# Patient Record
Sex: FEMALE | NOT HISPANIC OR LATINO | Employment: FULL TIME | URBAN - METROPOLITAN AREA
[De-identification: names, ages, dates, MRNs, and addresses within clinical notes are randomized per-mention and may not be internally consistent; named-entity substitution may affect disease eponyms.]

---

## 2017-09-15 ENCOUNTER — OFFICE VISIT - HEALTHEAST (OUTPATIENT)
Dept: FAMILY MEDICINE | Facility: CLINIC | Age: 57
End: 2017-09-15

## 2017-09-15 DIAGNOSIS — J32.9 SINUSITIS: ICD-10-CM

## 2019-05-31 ENCOUNTER — OFFICE VISIT - HEALTHEAST (OUTPATIENT)
Dept: FAMILY MEDICINE | Facility: CLINIC | Age: 59
End: 2019-05-31

## 2019-05-31 DIAGNOSIS — Z00.01 ENCOUNTER FOR ROUTINE ADULT HEALTH EXAMINATION WITH ABNORMAL FINDINGS: ICD-10-CM

## 2019-05-31 DIAGNOSIS — Z13.1 ENCOUNTER FOR SCREENING FOR DIABETES MELLITUS: ICD-10-CM

## 2019-05-31 DIAGNOSIS — Z13.220 ENCOUNTER FOR SCREENING FOR LIPOID DISORDERS: ICD-10-CM

## 2019-05-31 DIAGNOSIS — Z12.31 VISIT FOR SCREENING MAMMOGRAM: ICD-10-CM

## 2019-05-31 DIAGNOSIS — M25.552 HIP PAIN, LEFT: ICD-10-CM

## 2019-05-31 DIAGNOSIS — Z12.11 SCREEN FOR COLON CANCER: ICD-10-CM

## 2019-05-31 LAB
CHOLEST SERPL-MCNC: 203 MG/DL
ERYTHROCYTE [DISTWIDTH] IN BLOOD BY AUTOMATED COUNT: 9.9 % (ref 11–14.5)
FASTING STATUS PATIENT QL REPORTED: YES
FASTING STATUS PATIENT QL REPORTED: YES
GLUCOSE BLD-MCNC: 93 MG/DL (ref 70–99)
HCT VFR BLD AUTO: 42 % (ref 35–47)
HDLC SERPL-MCNC: 50 MG/DL
HGB BLD-MCNC: 14.4 G/DL (ref 12–16)
LDLC SERPL CALC-MCNC: 143 MG/DL
MCH RBC QN AUTO: 32.8 PG (ref 27–34)
MCHC RBC AUTO-ENTMCNC: 34.3 G/DL (ref 32–36)
MCV RBC AUTO: 96 FL (ref 80–100)
PLATELET # BLD AUTO: 200 THOU/UL (ref 140–440)
PMV BLD AUTO: 9.2 FL (ref 7–10)
RBC # BLD AUTO: 4.39 MILL/UL (ref 3.8–5.4)
TRIGL SERPL-MCNC: 52 MG/DL
WBC: 4.3 THOU/UL (ref 4–11)

## 2019-05-31 ASSESSMENT — MIFFLIN-ST. JEOR: SCORE: 1641.81

## 2019-06-14 ENCOUNTER — OFFICE VISIT - HEALTHEAST (OUTPATIENT)
Dept: PHYSICAL THERAPY | Facility: REHABILITATION | Age: 59
End: 2019-06-14

## 2019-06-14 DIAGNOSIS — M25.552 CHRONIC LEFT HIP PAIN: ICD-10-CM

## 2019-06-14 DIAGNOSIS — G89.29 CHRONIC LEFT HIP PAIN: ICD-10-CM

## 2019-06-14 DIAGNOSIS — M62.81 GENERALIZED MUSCLE WEAKNESS: ICD-10-CM

## 2019-06-21 ENCOUNTER — OFFICE VISIT - HEALTHEAST (OUTPATIENT)
Dept: PHYSICAL THERAPY | Facility: REHABILITATION | Age: 59
End: 2019-06-21

## 2019-06-21 DIAGNOSIS — M25.552 CHRONIC LEFT HIP PAIN: ICD-10-CM

## 2019-06-21 DIAGNOSIS — M62.81 GENERALIZED MUSCLE WEAKNESS: ICD-10-CM

## 2019-06-21 DIAGNOSIS — G89.29 CHRONIC LEFT HIP PAIN: ICD-10-CM

## 2019-08-02 ENCOUNTER — RECORDS - HEALTHEAST (OUTPATIENT)
Dept: RADIOLOGY | Facility: CLINIC | Age: 59
End: 2019-08-02

## 2019-08-02 ENCOUNTER — HOSPITAL ENCOUNTER (OUTPATIENT)
Dept: MAMMOGRAPHY | Facility: CLINIC | Age: 59
Discharge: HOME | End: 2019-08-02
Attending: FAMILY MEDICINE

## 2019-08-02 ENCOUNTER — RECORDS - HEALTHEAST (OUTPATIENT)
Dept: ADMINISTRATIVE | Facility: OTHER | Age: 59
End: 2019-08-02

## 2019-08-02 DIAGNOSIS — Z12.31 VISIT FOR SCREENING MAMMOGRAM: ICD-10-CM

## 2019-11-08 ENCOUNTER — RECORDS - HEALTHEAST (OUTPATIENT)
Dept: ADMINISTRATIVE | Facility: OTHER | Age: 59
End: 2019-11-08

## 2019-12-20 ENCOUNTER — OFFICE VISIT - HEALTHEAST (OUTPATIENT)
Dept: FAMILY MEDICINE | Facility: CLINIC | Age: 59
End: 2019-12-20

## 2019-12-20 DIAGNOSIS — I10 ESSENTIAL HYPERTENSION: ICD-10-CM

## 2019-12-20 DIAGNOSIS — R04.0 EPISTAXIS: ICD-10-CM

## 2019-12-20 LAB
ANION GAP SERPL CALCULATED.3IONS-SCNC: 6 MMOL/L (ref 5–18)
ATRIAL RATE - MUSE: 70 BPM
CHLORIDE BLD-SCNC: 105 MMOL/L (ref 98–107)
CO2 SERPL-SCNC: 29 MMOL/L (ref 22–31)
CREAT SERPL-MCNC: 0.73 MG/DL (ref 0.6–1.1)
DIASTOLIC BLOOD PRESSURE - MUSE: NORMAL
GFR SERPL CREATININE-BSD FRML MDRD: >60 ML/MIN/1.73M2
INTERPRETATION ECG - MUSE: NORMAL
P AXIS - MUSE: 30 DEGREES
POTASSIUM BLD-SCNC: 4.5 MMOL/L (ref 3.5–5)
PR INTERVAL - MUSE: 152 MS
QRS DURATION - MUSE: 72 MS
QT - MUSE: 388 MS
QTC - MUSE: 419 MS
R AXIS - MUSE: 18 DEGREES
SODIUM SERPL-SCNC: 140 MMOL/L (ref 136–145)
SYSTOLIC BLOOD PRESSURE - MUSE: NORMAL
T AXIS - MUSE: 11 DEGREES
VENTRICULAR RATE- MUSE: 70 BPM

## 2019-12-20 ASSESSMENT — MIFFLIN-ST. JEOR: SCORE: 1690.35

## 2020-01-14 ENCOUNTER — COMMUNICATION - HEALTHEAST (OUTPATIENT)
Dept: FAMILY MEDICINE | Facility: CLINIC | Age: 60
End: 2020-01-14

## 2020-01-14 DIAGNOSIS — I10 ESSENTIAL HYPERTENSION: ICD-10-CM

## 2020-01-31 ENCOUNTER — COMMUNICATION - HEALTHEAST (OUTPATIENT)
Dept: SCHEDULING | Facility: CLINIC | Age: 60
End: 2020-01-31

## 2020-01-31 DIAGNOSIS — I10 BENIGN ESSENTIAL HYPERTENSION: ICD-10-CM

## 2020-02-14 ENCOUNTER — OFFICE VISIT - HEALTHEAST (OUTPATIENT)
Dept: FAMILY MEDICINE | Facility: CLINIC | Age: 60
End: 2020-02-14

## 2020-02-14 DIAGNOSIS — T46.4X5A ADVERSE EFFECT OF ANGIOTENSIN-CONVERTING ENZYME INHIBITOR, INITIAL ENCOUNTER: ICD-10-CM

## 2020-02-14 DIAGNOSIS — I10 ESSENTIAL HYPERTENSION: ICD-10-CM

## 2020-02-14 DIAGNOSIS — R04.0 EPISTAXIS: ICD-10-CM

## 2020-02-14 LAB
ANION GAP SERPL CALCULATED.3IONS-SCNC: 9 MMOL/L (ref 5–18)
CHLORIDE BLD-SCNC: 104 MMOL/L (ref 98–107)
CO2 SERPL-SCNC: 26 MMOL/L (ref 22–31)
CREAT SERPL-MCNC: 0.78 MG/DL (ref 0.6–1.1)
GFR SERPL CREATININE-BSD FRML MDRD: >60 ML/MIN/1.73M2
POTASSIUM BLD-SCNC: 4.4 MMOL/L (ref 3.5–5)
SODIUM SERPL-SCNC: 139 MMOL/L (ref 136–145)

## 2020-02-14 ASSESSMENT — MIFFLIN-ST. JEOR: SCORE: 1748.86

## 2020-03-12 ENCOUNTER — AMBULATORY - HEALTHEAST (OUTPATIENT)
Dept: NURSING | Facility: CLINIC | Age: 60
End: 2020-03-12

## 2020-03-12 ENCOUNTER — COMMUNICATION - HEALTHEAST (OUTPATIENT)
Dept: FAMILY MEDICINE | Facility: CLINIC | Age: 60
End: 2020-03-12

## 2020-04-06 ENCOUNTER — COMMUNICATION - HEALTHEAST (OUTPATIENT)
Dept: SCHEDULING | Facility: CLINIC | Age: 60
End: 2020-04-06

## 2020-04-06 DIAGNOSIS — I10 ESSENTIAL HYPERTENSION: ICD-10-CM

## 2020-10-13 ENCOUNTER — AMBULATORY - HEALTHEAST (OUTPATIENT)
Dept: LAB | Facility: CLINIC | Age: 60
End: 2020-10-13

## 2020-10-13 DIAGNOSIS — I10 ESSENTIAL HYPERTENSION: ICD-10-CM

## 2020-10-13 LAB
ANION GAP SERPL CALCULATED.3IONS-SCNC: 11 MMOL/L (ref 5–18)
CHLORIDE BLD-SCNC: 104 MMOL/L (ref 98–107)
CO2 SERPL-SCNC: 26 MMOL/L (ref 22–31)
CREAT SERPL-MCNC: 0.76 MG/DL (ref 0.6–1.1)
GFR SERPL CREATININE-BSD FRML MDRD: >60 ML/MIN/1.73M2
POTASSIUM BLD-SCNC: 4.2 MMOL/L (ref 3.5–5)
SODIUM SERPL-SCNC: 141 MMOL/L (ref 136–145)

## 2020-11-16 ENCOUNTER — COMMUNICATION - HEALTHEAST (OUTPATIENT)
Dept: SCHEDULING | Facility: CLINIC | Age: 60
End: 2020-11-16

## 2020-11-16 DIAGNOSIS — I10 ESSENTIAL HYPERTENSION: ICD-10-CM

## 2020-11-17 ENCOUNTER — COMMUNICATION - HEALTHEAST (OUTPATIENT)
Dept: FAMILY MEDICINE | Facility: CLINIC | Age: 60
End: 2020-11-17

## 2020-11-17 DIAGNOSIS — I10 ESSENTIAL HYPERTENSION: ICD-10-CM

## 2020-12-01 ENCOUNTER — OFFICE VISIT - HEALTHEAST (OUTPATIENT)
Dept: FAMILY MEDICINE | Facility: CLINIC | Age: 60
End: 2020-12-01

## 2020-12-01 DIAGNOSIS — I10 ESSENTIAL HYPERTENSION: ICD-10-CM

## 2020-12-01 RX ORDER — LOSARTAN POTASSIUM 100 MG/1
100 TABLET ORAL DAILY
Qty: 90 TABLET | Refills: 3 | Status: SHIPPED | OUTPATIENT
Start: 2020-12-01 | End: 2021-12-20

## 2020-12-01 ASSESSMENT — MIFFLIN-ST. JEOR: SCORE: 1768.82

## 2020-12-31 ENCOUNTER — COMMUNICATION - HEALTHEAST (OUTPATIENT)
Dept: FAMILY MEDICINE | Facility: CLINIC | Age: 60
End: 2020-12-31

## 2020-12-31 DIAGNOSIS — I10 ESSENTIAL HYPERTENSION: ICD-10-CM

## 2021-01-04 RX ORDER — HYDROCHLOROTHIAZIDE 25 MG/1
25 TABLET ORAL DAILY
Qty: 90 TABLET | Refills: 2 | Status: SHIPPED | OUTPATIENT
Start: 2021-01-04 | End: 2021-09-22

## 2021-01-05 ENCOUNTER — AMBULATORY - HEALTHEAST (OUTPATIENT)
Dept: NURSING | Facility: CLINIC | Age: 61
End: 2021-01-05

## 2021-01-05 DIAGNOSIS — I10 BENIGN ESSENTIAL HYPERTENSION: ICD-10-CM

## 2021-03-24 ENCOUNTER — AMBULATORY - HEALTHEAST (OUTPATIENT)
Dept: NURSING | Facility: CLINIC | Age: 61
End: 2021-03-24

## 2021-04-14 ENCOUNTER — AMBULATORY - HEALTHEAST (OUTPATIENT)
Dept: NURSING | Facility: CLINIC | Age: 61
End: 2021-04-14

## 2021-05-25 ENCOUNTER — RECORDS - HEALTHEAST (OUTPATIENT)
Dept: ADMINISTRATIVE | Facility: CLINIC | Age: 61
End: 2021-05-25

## 2021-05-26 ENCOUNTER — RECORDS - HEALTHEAST (OUTPATIENT)
Dept: ADMINISTRATIVE | Facility: CLINIC | Age: 61
End: 2021-05-26

## 2021-05-26 VITALS — SYSTOLIC BLOOD PRESSURE: 144 MMHG | HEART RATE: 72 BPM | DIASTOLIC BLOOD PRESSURE: 96 MMHG

## 2021-05-27 VITALS — HEART RATE: 77 BPM | DIASTOLIC BLOOD PRESSURE: 85 MMHG | SYSTOLIC BLOOD PRESSURE: 130 MMHG

## 2021-05-29 NOTE — PROGRESS NOTES
[facility]   Hip Initial Evaluation    Patient Name: Zaida Stewart  Date of evaluation: 6/14/2019  Referral Diagnosis:   Referring provider: Juanita Mendoza MD  Visit Diagnosis:     ICD-10-CM    1. Chronic left hip pain M25.552     G89.29    2. Generalized muscle weakness M62.81        Assessment:   Zaida Stewart is a 58 y.o. female who presents to therapy today with chief complaints of left sided hip pain for 5 years without injury. Pt has Hx of hip pain treated with chiropractic care which has helped, but continues to experience L groin pain with standing/walking/ADL's such as dressing. Evaluation today reveals: restrictions in L hip PROM, + hip provocation testing, weakness. S/s appear consistent with hip OA and/or cartilage degeneration. Patient will benefit from 1:1 skilled physical therapy services to address the above limitations.     Goals:  Pt. will demonstrate/verbalize independence in self-management of condition in : 2 weeks  Pt. will be independent with home exercise program in : 2 weeks    Pt will: bend forward to tie shoes with pain 2/10 or less to improve ADL's in 8 weeks   Pt will: walk >15' without increased hip pain during or after for exercise in 8 weeks  Pt will: ascend/descend stairs with pain 2/10 or less to improve stair climbing in 8 weeks      Patient's expectations/goals are realistic.    Barriers to Learning or Achieving Goals:  No Barriers.       Plan / Patient Instructions:        Plan of Care:   Authorization / Certification Start Date: 06/14/19  Authorization / Certification End Date: 09/12/19  Authorization / Certification Number of Visits: up to 8 visits   Communication with: Referral Source;Patient Caregiver  Patient Related Instruction: Nature of Condition;Treatment plan and rationale;Basis of treatment;Body mechanics;Posture;Precautions;Next steps;Expected outcome  Times per Week: 1-2x/week  Number of Weeks: up to 12 weeks  Number of Visits: up to 8 visits    Discharge Planning: to I HEP  Therapeutic Exercise: ROM;Stretching;Strengthening  Neuromuscular Reeducation: kinesio tape;posture;balance/proprioception;TNE;postural restoration  Manual Therapy: soft tissue mobilization;myofascial release;joint mobilization;muscle energy  Other Plan #1: therapeutic activity       Plan for next visit: progress HEP - cont hip mobilization if beneficial       Subjective:         Zaida Stewart is a 57 y/o female who presents today with chief c/o chronic left sided hip pain for 5 years without injury. Patient noticed hip pain after having her second daughter which did eventually resolve. She notices pain starting again 5-6 years ago. Pt saw a chiropractor which did help somewhat but she discontinued due to insurance reasons. In 2015/ she noticed difficulty walking due to her left hip pain. She returned to chiropractor with some relief again. Patient also started yoga which was helping. She notices pain with transition from sit>stand and stand>walk. Once she walks for long distances she feels soreness.   No c/o clicking/catching. Positions at night do not make a difference - has groin pain at night.     Patient goals: bend over, dressing ex: shoes/socks.       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      Hip Examination  1. Chronic left hip pain     2. Generalized muscle weakness       Precautions/Restrictions: None  Involved Side: Left    Posture Observation:   Standing:  PSIS level in standing  Sitting:    Gait Observation:   Non antalgic     Lumbar assessment:  Flexion: full with anterior hip/groin pain on L  Extension: full no pain   R rot: full L  "groin pain  L rot: full no pain       Hip ROM:    Date:      Hip ROM( ) AROM in degrees AROM in degrees AROM in degrees    Right Left Right Left Right Left   Hip Flexion (0-120 )         Hip Abduction (0-45 )         Hip External Rotation (0-50 )         Hip Internal Rotation (0-40 )         Hip Extension (0-15 )          PROM in degrees PROM in degrees PROM in degrees    Right Left Right Left Right Left   Hip Flexion (0-120 )         Hip Abduction (0-45 )         Hip External Rotation (0-50 )         Hip Internal Rotation (0-40 )         Hip Extension (0-15 )                 L hip:  L hip flexion WNL, IR/ER WNL  SLR to approx 90 no pain     R hip:   R flexion to approx 100 with pain  IR tightness present with pain   ER tightness with pain         + RLLD <1cm  R anterior rotation               Hip/Knee Strength     Date:      Hip/Knee Strength (/5) MMT MMT MMT    Right Left Right Left Right Left   Hip Flexion 4+ 4+       Hip Abduction 4 4       Hip Adduction         Hip Extension         Hip External Rotation 4 4       Hip Internal Rotation         Knee Extension 5 5       Knee Flexion             Flexibility:  Norbert test:            Hip Special Tests  OA Right (+/-) Left (+/-) Intra Articular Right (+/-) Left (+/-)   Hip Scour   ARISTEO   +   Test Cluster  -Hip pain  -Hip IR <15   -Hip Flex <115    FADIR  +   Test Cluster  -Painful Hip IR  ->50 years old  -Morning Stiffness <60 min   Passive Supine Rotation Test     SIJ Right (+/-) Left (+/-) Lateral Rim Impingement     SIJ Compression   Other     SIJ Distraction   Other     POSH Test   Other     Sacral Thrust   Other           Palpation:                        Treatment Today     TREATMENT MINUTES COMMENTS   Evaluation 25 Hip evaluation    Self-care/ Home management     Manual therapy 10 Grade II posterior glide with slight lateral pull to L hip     Grade II LE long axis distraction L hip with sustained holds x 10\"   Neuromuscular Re-education     Therapeutic " "Activity     Therapeutic Exercises 15 Initiated HEP:  Exercises:  Exercise #1: Ab set with marching - TA  Comment #1: x 10 reps, 3-4 sets  Exercise #2: Piriformis stretch- supine, modified to avoid hip adduction  Comment #2: x 30\" x 2-3 reps       Discussed benefits of walking vs biking for continued aerobic exercise.   Gait training     Modality__________________                Total 50    Blank areas are intentional and mean the treatment did not include these items.       PT Evaluation Code: (Please list factors)  Patient History/Comorbidities: none reported by pt  Examination: see above  Clinical Presentation: stable  Clinical Decision Making: low    Patient History/  Comorbidities Examination  (body structures and functions, activity limitations, and/or participation restrictions) Clinical Presentation Clinical Decision Making (Complexity)   No documented Comorbidities or personal factors 1-2 Elements Stable and/or uncomplicated Low   1-2 documented comorbidities or personal factor 3 Elements Evolving clinical presentation with changing characteristics Moderate   3-4 documented comorbidities or personal factors 4 or more Unstable and unpredictable High              Reyna Gonzalez  6/14/2019  9:34 AM                 "

## 2021-05-29 NOTE — PROGRESS NOTES
FEMALE PREVENTATIVE EXAM    Assessment and Plan:       1. Encounter for routine adult health examination with abnormal findings    - HM2(CBC w/o Differential)    2. Hip pain, left  Likely musculoskeletal, given improvement with chiropractic visits.  Would recommend referral to physical therapy for initial exercises.  Follow-up in 4 to 8 weeks if symptoms persist, consider additional imaging at that time.  - Ambulatory referral to PT/OT    3. Visit for screening mammogram  Counseled patient regarding mammogram, ordered today.  - Mammo Screening Bilateral; Future    4. Screen for colon cancer  Discussed with patient various options for colon cancer screening.  She would like to proceed with colonoscopy.  - Ambulatory referral for Colonoscopy    5. Encounter for screening for diabetes mellitus  6. Encounter for screening for lipoid disorders  Fasting today, will check labs, inform patient of results once we have them.  - Glucose  - Lipid Cascade- FASTING     Next follow up:  Return in about 2 months (around 7/31/2019), or if hip symptoms worsen or fail to improve.    Immunization Review  Adult Imm Review: No immunizations due today        I discussed the following with the patient:   Adult Healthy Living: Importance of regular exercise  Healthy nutrition        Subjective:   Chief Complaint: Zaida Stewart is an 58 y.o. female, new to me, here for a preventative health visit.     HPI:  Has had bilateral retinal tears, recently having more vision changes, has been followed closely by ophthalmology.      She is also had some intermittent groin pain left greater than right.  She used to see a chiropractor monthly, though has not gone in over one year. No numbness, tingling, or weakness.    Healthy Habits  Are you taking a daily aspirin? No  Do you typically exercising at least 40 min, 3-4 times per week?  Yes  Do you usually eat at least 4 servings of fruit and vegetables a day, include whole grains and fiber and avoid  "regularly eating high fat foods? Yes  Have you had an eye exam in the past two years? Yes  Do you see a dentist twice per year? NO  Do you have any concerns regarding sleep? No    Safety Screen  Do you feel you are safe where you are living?: Yes (5/31/2019 11:16 AM)  Do you feel you are safe in your relationship(s)?: Yes (5/31/2019 11:16 AM)      Review of Systems:  Please see above.  The rest of the review of systems are negative for all systems.     Pap History:   No - age 30-65 PAP every 5 years recommended  Cancer Screening       Status Date      MAMMOGRAM Overdue 1960     COLONOSCOPY Overdue 6/21/2010     PAP SMEAR Next Due 12/17/2020      Done 12/17/2015 GYNECOLOGIC CYTOLOGY (PAP SMEAR)          Patient Care Team:  Provider, No Primary Care as PCP - General        History     Reviewed By Date/Time Sections Reviewed    Juanita Mendoza MD 5/31/2019 11:38 AM Social Documentation    Juanita Mendoza MD 5/31/2019 11:37 AM Family    Juanita Mendoza MD 5/31/2019 11:35 AM Medical, Surgical    Paula Martinez [facility] 5/31/2019 11:19 AM Tobacco            Objective:   Vital Signs:   Visit Vitals  /90 (Patient Site: Right Arm, Patient Position: Sitting, Cuff Size: Adult Large)   Pulse 72   Resp 16   Ht 5' 8\" (1.727 m)   Wt (!) 225 lb 9.6 oz (102.3 kg)   Breastfeeding? No   BMI 34.30 kg/m           PHYSICAL EXAM  General Appearance: Alert, cooperative, no distress, appears stated age  Head: Normocephalic, without obvious abnormality, atraumatic  Eyes: PERRL, conjunctiva/corneas clear, EOM's intact  Ears: Normal TM's and external ear canals, both ears  Nose: Nares normal, septum midline,mucosa normal, no drainage  Throat: Lips, mucosa, and tongue normal; teeth and gums normal  Neck: Supple, symmetrical, trachea midline, no adenopathy;  thyroid: not enlarged, symmetric, no tenderness/mass/nodules;   Back: Symmetric, no curvature, ROM normal, no CVA tenderness  Lungs: Clear to auscultation bilaterally, " respirations unlabored  Breasts: No breast masses, tenderness, asymmetry, or nipple discharge.  Heart: Regular rate and rhythm, no murmur.   Abdomen: Soft, non-tender, bowel sounds active all four quadrants,  no masses, no organomegaly  Pelvic:Not examined  Extremities: Extremities normal, atraumatic, no cyanosis or edema  Musculoskeletal: Left hip: Decreased range of motion with flexion, internal and external rotation.  She does have some mild pain with flexion as well.  Right hip: Normal range of motion with flexion, extension, internal and external rotation.  No pain noted.  Normal gait.  Skin: Skin color, texture, turgor normal, no rashes or lesions  Lymph nodes: Cervical, supraclavicular, and axillary nodes normal  Neurologic: Alert.   Psych: Normal affect.  Does not appear anxious or depressed.        The ASCVD Risk score (Mac TASHI Jr., et al., 2013) failed to calculate for the following reasons:    Cannot find a previous HDL lab    Cannot find a previous total cholesterol lab         Medication List      as of 5/31/2019 12:30 PM     You have not been prescribed any medications.         Additional Screenings Completed Today:

## 2021-05-29 NOTE — PROGRESS NOTES
"[facility] Daily Progress     Patient Name: Zaida Stewart  Date: 6/21/2019  Visit #: 2/8   PTA visit #:    Referral Diagnosis: Hip pain, left  Referring provider: No ref. provider found  Visit Diagnosis:     ICD-10-CM    1. Chronic left hip pain M25.552     G89.29    2. Generalized muscle weakness M62.81          Assessment:   Patient returns for first PT follow up. She reported relief after last session lasting 2-3 days, but then felt increase pain and a \"shift\" in her hip during her piriformis stretch. She did however report improved pain and displays less antalgic gait post treatment today with MET. Given patient's significantly restricted ROM, antalgic gait, and increased pain with gentle hip stretching, pt is appropriate for additional imaging and potential referral to orthopedics at this time. Plan to cont PT as tolerated until more information is obtained.    Patient is benefitting from skilled physical therapy and is making steady progress toward functional goals.  Patient is appropriate to continue with skilled physical therapy intervention, as indicated by initial plan of care.    Goal Status:  Pt. will demonstrate/verbalize independence in self-management of condition in : 2 weeks  Pt. will be independent with home exercise program in : 2 weeks    Pt will: bend forward to tie shoes with pain 2/10 or less to improve ADL's in 8 weeks   Pt will: walk >15' without increased hip pain during or after for exercise in 8 weeks  Pt will: ascend/descend stairs with pain 2/10 or less to improve stair climbing in 8 weeks      Plan / Patient Education:     Continue with initial plan of care.  Progress with home program as tolerated.    Subjective:   Patient returns to PT for first treatment session. She reported improved pain for 2-3 days post evaluation and felt hip mobilization improved her pain.   She noticed pain on Sunday-Monday with her left piriformis stretch and felt a \"shift\" in her hip.     Pain " Ratin    Objective:     R LLD   L posterior rotation    L hip flx significantly limited with pain  IR/ER also significantly limited, also with pain   Cely not tested  Antalgic gait with decreased L stance - improved post treatment     Treatment Today     TREATMENT MINUTES COMMENTS   Evaluation     Self-care/ Home management     Manual therapy 20 Gentle MET including pubic clearing (good strength, no pain with this), followed by gentle R LE long axis distraction (no quick stretch added), and MET for L posterior rotation with increasing R hip flx. No pain with MET.   Improved gait following treatment, less pain reported overall.     Discussed patient's pain and progress. Suggested pt is appropriate for x-ray, MRI and or/ ortho referral as she should not be experiencing this much pain with gentle stretching. Pt in agreement.        Neuromuscular Re-education     Therapeutic Activity     Therapeutic Exercises     Gait training     Modality__________________                Total 20 Pt arrived late to appt today    Blank areas are intentional and mean the treatment did not include these items.       Reyna Gonzalez  2019

## 2021-05-31 VITALS — WEIGHT: 240.6 LBS | BODY MASS INDEX: 35.79 KG/M2

## 2021-05-31 NOTE — PROGRESS NOTES
[facility] Discharge Summary  Patient Name: Zaida Stewart  Date: 8/22/2019  Referral Diagnosis: Hip pain, left  Visit Diagnosis:   1. Chronic left hip pain     2. Generalized muscle weakness         Goals: NOT MET  Pt. will demonstrate/verbalize independence in self-management of condition in : 2 weeks  Pt. will be independent with home exercise program in : 2 weeks    Pt will: bend forward to tie shoes with pain 2/10 or less to improve ADL's in 8 weeks   Pt will: walk >15' without increased hip pain during or after for exercise in 8 weeks  Pt will: ascend/descend stairs with pain 2/10 or less to improve stair climbing in 8 weeks      Patient was seen for 2 visits from 6/14/19 to 6/21/19 with 0 missed appointments and 2 cancelled appts.  The patient attended therapy initially, but did not finish the therapy sessions prescribed.  Goals were not fully achieved. Explanation for goals not achieved: Unable to formally assess progress towards goals as pt did not return to PT.'  Patient received a home program .  The patient discontinued therapy, did not return.    Therapy will be discontinued at this time.  The patient will need a new referral to resume.    Thank you for your referral.  Grace Matias, PT, DPT  8/22/2019  3:35 PM

## 2021-06-03 VITALS — WEIGHT: 225.6 LBS | HEIGHT: 68 IN | BODY MASS INDEX: 34.19 KG/M2

## 2021-06-04 ENCOUNTER — COMMUNICATION - HEALTHEAST (OUTPATIENT)
Dept: SCHEDULING | Facility: CLINIC | Age: 61
End: 2021-06-04

## 2021-06-04 ENCOUNTER — OFFICE VISIT - HEALTHEAST (OUTPATIENT)
Dept: FAMILY MEDICINE | Facility: CLINIC | Age: 61
End: 2021-06-04

## 2021-06-04 VITALS
SYSTOLIC BLOOD PRESSURE: 176 MMHG | BODY MASS INDEX: 37.77 KG/M2 | HEART RATE: 96 BPM | RESPIRATION RATE: 18 BRPM | HEIGHT: 68 IN | DIASTOLIC BLOOD PRESSURE: 96 MMHG | WEIGHT: 249.2 LBS

## 2021-06-04 VITALS
DIASTOLIC BLOOD PRESSURE: 92 MMHG | BODY MASS INDEX: 35.81 KG/M2 | WEIGHT: 236.3 LBS | RESPIRATION RATE: 16 BRPM | SYSTOLIC BLOOD PRESSURE: 140 MMHG | HEIGHT: 68 IN | HEART RATE: 78 BPM
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
## 2021-06-04 RX ORDER — DOXYCYCLINE 100 MG/1
100 CAPSULE ORAL 2 TIMES DAILY
Qty: 28 CAPSULE | Refills: 0 | Status: SHIPPED | OUTPATIENT
Start: 2021-06-04 | End: 2021-06-18

## 2021-06-04 ASSESSMENT — MIFFLIN-ST. JEOR: SCORE: 1779.24

## 2021-06-04 NOTE — PROGRESS NOTES
Assessment / Impression     1. Essential hypertension  Electrocardiogram Perform - Clinic    Electrolyte Profile    Creatinine    lisinopril (PRINIVIL,ZESTRIL) 10 MG tablet   2. Epistaxis           Plan:     Her blood pressure did  improve upon recheck, however, given she has had multiple elevated blood pressure readings in the past, would recommend we start her on antihypertensive medication.  We will start patient on lisinopril 10 mg daily.  Discussed possible side effects of medication such as cough, and in rare instances, may experience angioedema.  If this occurs, she needs to go to ED immediately.  I reviewed today's EKG results with patient.  Will check labs, have patient follow-up in 2 weeks and recheck labs at that time.    Return in about 2 weeks (around 1/3/2020) for Blood Pressure Check with Dr. Mendoza.    Subjective:      HPI: Zaida Stewart is a 59 y.o. female who presents for blood pressure concerns.  Briefly, she was seen in the ED 2 days ago for epistaxis, had elevated blood pressure reading at that time.  She also had colonoscopy in the last few months, was noted to have elevated blood pressure in the 160 systolic.  She denies any headache, chest pain, vision changes.  Has not been on antihypertensive previously.  She has had some weight gain over the last several months.      Medical History:     Patient Active Problem List   Diagnosis     Obesity       No past medical history on file.    Past Surgical History:   Procedure Laterality Date     DILATION AND CURETTAGE OF UTERUS       EYE SURGERY       laparatomy       RETINAL DETACHMENT SURGERY Bilateral        Current Medications:     Current Outpatient Medications   Medication Sig     lisinopril (PRINIVIL,ZESTRIL) 10 MG tablet Take 1 tablet (10 mg total) by mouth daily.       Family History:     Family History   Problem Relation Age of Onset     Breast cancer Mother 45     Arthritis Maternal Grandmother      Stroke Paternal Grandfather      Breast  "cancer Maternal Aunt 53     Heart attack Paternal Aunt        Review of Systems  All other systems reviewed and are negative.         Social History:     Social History     Tobacco Use   Smoking Status Former Smoker     Packs/day: 0.50     Years: 7.00     Pack years: 3.50     Types: Cigarettes   Smokeless Tobacco Never Used     Social History     Social History Narrative    , lives with  and dog.  Works as .  Has 2 adult children.         Objective:     BP (!) 140/92 (Patient Site: Right Arm, Patient Position: Sitting, Cuff Size: Adult Large)   Pulse 78   Resp 16   Ht 5' 8\" (1.727 m)   Wt (!) 236 lb 4.8 oz (107.2 kg)   BMI 35.93 kg/m    Physical Examination: General appearance - alert, well appearing, and in no distress  Eyes: extraocular eye movements intact  Nose: no sign of active bleeding.  Mouth: mucous membranes moist, pharynx normal without lesions  Neck: supple, no significant adenopathy or thyromegaly  Lungs: clear to auscultation, no wheezes, rales or rhonchi, symmetric air entry  Heart: normal rate, regular rhythm, normal S1, S2, no murmurs.  Abdomen: soft, nontender, nondistended, no masses or organomegaly  Neurological: alert, oriented, normal speech, no focal findings or movement disorder noted.    Extremities: No edema, no clubbing or cyanosis  Psychiatric: Normal affect. Does not appear anxious or depressed.    EKG: By my interpretation normal sinus rhythm (cardiology read pending)      Juanita Mendoza MD  12/20/2019  11:18 AM        "

## 2021-06-05 VITALS
TEMPERATURE: 97.6 F | DIASTOLIC BLOOD PRESSURE: 100 MMHG | HEIGHT: 68 IN | HEART RATE: 87 BPM | WEIGHT: 253.6 LBS | SYSTOLIC BLOOD PRESSURE: 138 MMHG | OXYGEN SATURATION: 96 % | BODY MASS INDEX: 38.43 KG/M2 | RESPIRATION RATE: 18 BRPM

## 2021-06-05 NOTE — TELEPHONE ENCOUNTER
Left detailed message letting pt know she needed to call back and schedule her appt within the next 2 weeks   Please assist pt in doing so reserved spots ok

## 2021-06-05 NOTE — TELEPHONE ENCOUNTER
SHe should stop lisinopril.  2 week rx for losartan signed.  I see she has 2/28 OV scheduled with me.  She needs to have OV in 2 weeks, as I was to f/u with her in beginning of Jan. Please assist patient in scheduling appointment.

## 2021-06-05 NOTE — TELEPHONE ENCOUNTER
Refill Approved    Rx renewed per Medication Renewal Policy. Medication was last renewed on 12/20/19.    Reema Buchanan, [facility] Triage/Med Refill 1/17/2020     Requested Prescriptions   Pending Prescriptions Disp Refills     lisinopril (PRINIVIL,ZESTRIL) 10 MG tablet [Pharmacy Med Name: LISINOPRIL 10MG TABLETS] 30 tablet 0     Sig: TAKE 1 TABLET BY MOUTH EVERY DAY       Ace Inhibitors Refill Protocol Passed - 1/14/2020  7:02 PM        Passed - PCP or prescribing provider visit in past 12 months       Last office visit with prescriber/PCP: 12/20/2019 Juanita Mendoza MD OR same dept: 12/20/2019 Juanita Mendoza MD OR same specialty: 12/20/2019 Juanita Mendoza MD  Last physical: 5/31/2019 Last MTM visit: Visit date not found   Next visit within 3 mo: Visit date not found  Next physical within 3 mo: Visit date not found  Prescriber OR PCP: Juanita Mendoza MD  Last diagnosis associated with med order: 1. Essential hypertension  - lisinopril (PRINIVIL,ZESTRIL) 10 MG tablet [Pharmacy Med Name: LISINOPRIL 10MG TABLETS]; TAKE 1 TABLET BY MOUTH EVERY DAY  Dispense: 30 tablet; Refill: 0    If protocol passes may refill for 12 months if within 3 months of last provider visit (or a total of 15 months).             Passed - Serum Potassium in past 12 months     Lab Results   Component Value Date    Potassium 4.5 12/20/2019             Passed - Blood pressure filed in past 12 months     BP Readings from Last 1 Encounters:   12/20/19 (!) 140/92             Passed - Serum Creatinine in past 12 months     Creatinine   Date Value Ref Range Status   12/20/2019 0.73 0.60 - 1.10 mg/dL Final

## 2021-06-05 NOTE — TELEPHONE ENCOUNTER
Pt states she needs to cancel her DrMikaylaappointment this morning and try to reschedule.   Pt is wanting to switch from Lisinopril to Losartan.  Pt states she has a cough from the Lisinopril and wants to switch medications.   States the cough is always there, especially when waking up.   Started when she started Lisinopril.    Reason for Disposition    Taking an ACE Inhibitor medication (e.g., benazepril/LOTENSIN, captopril/CAPOTEN, enalapril/VASOTEC, lisinopril/ZESTRIL)    Cough has been present for > 3 weeks    Protocols used: COUGH-A-OH    Pt is requesting note be sent to Dr. Mendoza to see if she would be willing to switch her medication. Pharmacy confirmed.   Pt rescheduled to 2/28/2020.  Please advise.  Nicky Diallo, RN   [facility] RN Triage

## 2021-06-06 NOTE — TELEPHONE ENCOUNTER
Please let her know that we need to check her labs too (already ordered) and bc BP still elevated, recommend increasing losartan to 100mg.  Return for lab/BP check in 2 weeks

## 2021-06-06 NOTE — TELEPHONE ENCOUNTER
BP check today 154/96  Pulse-72  BP recheck after more than 5 minutes 144/96.  Medication list reviewed with patient and correct.  Please advise and send [employer]t message to patient.  Thank you.

## 2021-06-06 NOTE — PROGRESS NOTES
Assessment / Impression     1. Essential hypertension  losartan (COZAAR) 50 MG tablet    Electrolyte Profile    Creatinine    Electrolyte Profile    Creatinine   2. Adverse effect of angiotensin-converting enzyme inhibitor, initial encounter     3. Epistaxis           Plan:     Patient's blood pressure had actually increased upon recheck.  She does feel quite stressed financially, and worried that she is unable to afford the multiple office visits we have discussed.  I did discuss with patient that once labs  and blood pressure have normalized, she would only need to have a blood pressure check annually.  Given adverse effect of lisinopril, this medication should be discontinued.  We will start losartan 50 mg daily.  Discussed possible side effects of medication.  Due to patient's finances, will have her follow-up with a nurse visit in 2 weeks, and recheck labs in 2 weeks at that time as well.  Did discuss with patient that if there are additional medication adjustments that need to be completed, she may need an office visit.  If finances are truly her burden, consider referral to care management for further patient assistance.    In regard to epistaxis episodes, she has not had any more since last week.  Discussed use of petroleum jelly within the nostrils.  Also use cool mist humidifier in bedroom.  Follow-up if symptoms recur for reevaluation, consider referral to ENT in the future.    Return in about 2 weeks (around 2/28/2020) for nurse visit and lab visit to recheck BP.    Subjective:      HPI: Zaida Stewart is a 59 y.o. female who presents for hypertension follow-up.  She has been having adverse effects with lisinopril, feeling like she constantly needs to clear her throat and cough.  There was a phone message sent to me a few weeks ago regarding these adverse effects, and I did document that patient should stop lisinopril and I had sent a prescription for losartan to her pharmacy.  However, this message never  "got to the patient, and since then she has continued lisinopril and continued to note cough.  She never started losartan.  She denies any headache, chest pain, vision changes.    She has felt more stressed recently, due to her 's unemployment, does feel some financial barriers.    Of note, last week and patient had 3 consecutive days of epistaxis of both nostrils.  Since then she has not had any episodes.  She does not take any blood thinners such as aspirin.  No NSAIDs.      Medical History:     Patient Active Problem List   Diagnosis     Obesity       History reviewed. No pertinent past medical history.    Past Surgical History:   Procedure Laterality Date     DILATION AND CURETTAGE OF UTERUS       EYE SURGERY       laparatomy       RETINAL DETACHMENT SURGERY Bilateral        Current Medications:     Current Outpatient Medications   Medication Sig     losartan (COZAAR) 50 MG tablet Take 1 tablet (50 mg total) by mouth daily.       Family History:     Family History   Problem Relation Age of Onset     Breast cancer Mother 45     Arthritis Maternal Grandmother      Stroke Paternal Grandfather      Breast cancer Maternal Aunt 53     Heart attack Paternal Aunt        Review of Systems  All other systems reviewed and are negative.         Social History:     Social History     Tobacco Use   Smoking Status Former Smoker     Packs/day: 0.50     Years: 7.00     Pack years: 3.50     Types: Cigarettes   Smokeless Tobacco Never Used     Social History     Social History Narrative    , lives with  and dog.  Works as .  Has 2 adult children.         Objective:     BP (!) 176/96   Pulse 96   Resp 18   Ht 5' 8\" (1.727 m)   Wt (!) 249 lb 3.2 oz (113 kg)   BMI 37.89 kg/m    Physical Examination: General appearance - alert, well appearing, and in no distress  Eyes: extraocular eye movements intact  Ears: normal external ears, clear canals,  Left TM appears normal, with no redness or " bulging noted.  Right TM appears normal, with no redness or bulging noted.  Nose: no active bleeding noted of nares bilaterally  Mouth: mucous membranes moist, pharynx normal without lesions  Neck: supple, no significant adenopathy or thyromegaly  Lungs: clear to auscultation, no wheezes, rales or rhonchi, symmetric air entry  Heart: normal rate, regular rhythm, normal S1, S2, no murmurs.  Neurological: alert, oriented, normal speech, no focal findings or movement disorder noted.    Extremities: No edema, no clubbing or cyanosis  Psychiatric: Normal affect. Does not appear anxious or depressed.    No results found for this or any previous visit (from the past 168 hour(s)).      Juanita Mendoza MD  2/14/2020  2:55 PM

## 2021-06-07 NOTE — TELEPHONE ENCOUNTER
Refill Approved    Rx renewed per Medication Renewal Policy. Medication was last renewed on 2/14/20.    Reema Buchanan, [facility] Triage/Med Refill 4/8/2020     Requested Prescriptions   Pending Prescriptions Disp Refills     losartan (COZAAR) 50 MG tablet 30 tablet 1     Sig: Take 1 tablet (50 mg total) by mouth daily.       Angiotensin Receptor Blocker Protocol Passed - 4/6/2020  4:07 PM        Passed - PCP or prescribing provider visit in past 12 months       Last office visit with prescriber/PCP: 2/14/2020 Juanita Mendoza MD OR same dept: Visit date not found OR same specialty: Visit date not found  Last physical: 5/31/2019 Last MTM visit: Visit date not found   Next visit within 3 mo: Visit date not found  Next physical within 3 mo: Visit date not found  Prescriber OR PCP: Juanita Mendoza MD  Last diagnosis associated with med order: 1. Essential hypertension  - losartan (COZAAR) 50 MG tablet; Take 1 tablet (50 mg total) by mouth daily.  Dispense: 30 tablet; Refill: 1    If protocol passes may refill for 12 months if within 3 months of last provider visit (or a total of 15 months).             Passed - Serum potassium within the past 12 months     Lab Results   Component Value Date    Potassium 4.4 02/14/2020             Passed - Blood pressure filed in past 12 months     BP Readings from Last 1 Encounters:   03/12/20 (!) 144/96             Passed - Serum creatinine within the past 12 months     Creatinine   Date Value Ref Range Status   02/14/2020 0.78 0.60 - 1.10 mg/dL Final

## 2021-06-13 NOTE — PROGRESS NOTES
ASSESSMENT:   1. Sinusitis  amoxicillin-clavulanate (AUGMENTIN) 875-125 mg per tablet       PLAN:  Sinusitis  Augmentin prescribed. Recommend probiotics such as acidophillis and bifidus to replace the normal bacteria that lives in the colon and gets depleted by antibiotics. You can buy it as an over the counter supplement (Culturelle, Florajen) or eat yogurt with live or active cultures.  Push fluids, get extra rest  Recommend hot tea with lemon/honey, lozenges  Recommend hot steamy showers, saline nasal spray or a netti pot to relieve congestion  Return to clinic if symptoms are not improving as expected or if worsening in any way.       SUBJECTIVE:   Zaida Stewart is a 57 y.o. female presents today with cold symptoms for over 2 weeks. She has had nasal congestion, facial pain, plugged ears, sore throat, subjective fever, occasional dry cough but denies sneezing, dry cough, myalgias, headache, fever and chills. Sick contacts: . Has tried sudafed without relief.     No past medical history on file.    History   Smoking Status     Never Smoker   Smokeless Tobacco     Never Used       Current Medications:  Current Outpatient Prescriptions   Medication Sig Dispense Refill     amoxicillin-clavulanate (AUGMENTIN) 875-125 mg per tablet Take 1 tablet by mouth 2 (two) times a day for 10 days. 20 tablet 0     No current facility-administered medications for this visit.        Allergies:   Allergies   Allergen Reactions     Ibuprofen Swelling       OBJECTIVE:   Vitals:    09/15/17 1934   BP: 126/64   Pulse: 90   Temp: 97.8  F (36.6  C)   TempSrc: Oral   SpO2: 98%   Weight: (!) 240 lb 9.6 oz (109.1 kg)     Physical exam reveals a pleasant 57 y.o. female.   Appears healthy, alert and cooperative.  Eyes:  IGNACIA, EOMI  Ears:  normal TMs bilaterally and normal canals bilaterally  Nose:    Mucosa normal. Scant, clear rhinorrhea.septum midline, normal mucosa  Mouth:  Mucosa pink and moist.  mild erythema   Neck: normal,  supple and no adenopathy  Sinuses: maxillary sinus tender with palpation  Lungs: Chest is clear, no wheezing or rales. Symmetric air entry throughout both lung fields.  Heart: regular rate and rhythm, no murmur, rub or gallop

## 2021-06-13 NOTE — TELEPHONE ENCOUNTER
Patient Returning Call  Reason for call:  Patient called back.  Information relayed to patient:  n/a  Patient has additional questions:  Yes  If YES, what are your questions/concerns:  Calling on the status of this message.  Please address as she is out of the medication.  Okay to leave a detailed message?: Yes

## 2021-06-13 NOTE — PROGRESS NOTES
Assessment / Impression     1. Essential hypertension  hydroCHLOROthiazide (HYDRODIURIL) 25 MG tablet    losartan (COZAAR) 100 MG tablet         Plan:     Patient blood pressure is still elevated, and therefore for this reason I would recommend adding another antihypertensive.  We discussed options including amlodipine or hydrochlorothiazide, patient would like to start hydrochlorothiazide 25 mg daily in addition to losartan 100 mg daily.  Discussed possible adverse effects of medication, she should return to clinic in 2 weeks to recheck blood pressure and labs.    Return in about 2 weeks (around 12/15/2020) for Blood Pressure Check.    Subjective:      HPI: Zaida Stewart is a 60 y.o. female who presents for hypertension follow-up.  Briefly, I had increase patient's losartan to 100 mg daily in March, however she has not followed up with me since then.  We have checked electrolytes and kidney function since then, which were normal.  She denies any headache, chest pain, vision changes.  However she has noted generalized more stress over the last year.      Medical History:     Patient Active Problem List   Diagnosis     Obesity       History reviewed. No pertinent past medical history.    Past Surgical History:   Procedure Laterality Date     DILATION AND CURETTAGE OF UTERUS       EYE SURGERY       laparatomy       RETINAL DETACHMENT SURGERY Bilateral        Current Medications:     Current Outpatient Medications   Medication Sig     hydroCHLOROthiazide (HYDRODIURIL) 25 MG tablet Take 1 tablet (25 mg total) by mouth daily.     losartan (COZAAR) 100 MG tablet Take 1 tablet (100 mg total) by mouth daily.       Family History:     Family History   Problem Relation Age of Onset     Breast cancer Mother 45     Arthritis Maternal Grandmother      Stroke Paternal Grandfather      Breast cancer Maternal Aunt 53     Heart attack Paternal Aunt        Review of Systems  All other systems reviewed and are negative.         Social  "History:     Social History     Tobacco Use   Smoking Status Former Smoker     Packs/day: 0.50     Years: 7.00     Pack years: 3.50     Types: Cigarettes   Smokeless Tobacco Never Used     Social History     Social History Narrative    , lives with  and dog.  Works as .  Has 2 adult children.         Objective:     BP (!) 138/100 (Patient Site: Right Arm, Patient Position: Sitting, Cuff Size: Adult Large)   Pulse 87   Temp 97.6  F (36.4  C) (Tympanic)   Resp 18   Ht 5' 8\" (1.727 m)   Wt (!) 253 lb 9.6 oz (115 kg)   SpO2 96%   BMI 38.56 kg/m    Physical Examination: General appearance - alert, well appearing, and in no distress  Eyes: extraocular eye movements intact  Mouth: mucous membranes moist, pharynx normal without lesions  Neck: supple, no significant adenopathy or thyromegaly  Lungs: clear to auscultation, no wheezes, rales or rhonchi, symmetric air entry  Heart: normal rate, regular rhythm, normal S1, S2, no murmurs.  Abdomen: soft, nontender, nondistended, no masses or organomegaly  Neurological: alert, oriented, normal speech, no focal findings or movement disorder noted.    Extremities: No edema, no clubbing or cyanosis  Psychiatric: Normal affect. Does not appear anxious or depressed.    No results found for this or any previous visit (from the past 168 hour(s)).      Juanita Mendoza MD  12/1/2020  4:11 PM  Disclaimer: This note was dictated using speech recognition software. Minor errors in transcription may be present.        "

## 2021-06-13 NOTE — TELEPHONE ENCOUNTER
3 month rx signed, but she needs to come in for BP check, either nurse visit or OV to see me. Please assist patient in scheduling  appointment.

## 2021-06-14 NOTE — PROGRESS NOTES
Follow Up Blood Pressure Check    Zaida Stewart is a 60 y.o. female recommended to follow up for blood pressure check by Juanita Mendoza MD. Anihypertensive medications and adherence were verified: Yes.     Reason for visit: Medication adjustment    Medication change at last visit: hydrochlorothiazide 25mg added.    Today's Vitals: 130/85 p 77    Home blood pressure readings brought in today:   None    Lowest blood pressure today is less than 140/90 and they deny signs or symptoms of new onset      Marie Euceda    Current Outpatient Medications   Medication Sig Dispense Refill     hydroCHLOROthiazide (HYDRODIURIL) 25 MG tablet Take 1 tablet (25 mg total) by mouth daily. 90 tablet 2     losartan (COZAAR) 100 MG tablet Take 1 tablet (100 mg total) by mouth daily. 90 tablet 3     No current facility-administered medications for this visit.

## 2021-06-14 NOTE — TELEPHONE ENCOUNTER
Refill Approved    Rx renewed per Medication Renewal Policy. Medication was last renewed on 12/1/20.    Reema Buchanan, [facility] Triage/Med Refill 1/4/2021     Requested Prescriptions   Pending Prescriptions Disp Refills     hydroCHLOROthiazide (HYDRODIURIL) 25 MG tablet 30 tablet 0     Sig: Take 1 tablet (25 mg total) by mouth daily.       Diuretics/Combination Diuretics Refill Protocol  Passed - 12/31/2020 12:57 PM        Passed - Visit with PCP or prescribing provider visit in past 12 months     Last office visit with prescriber/PCP: 12/1/2020 Juanita Mendoza MD OR same dept: Visit date not found OR same specialty: 12/1/2020 Juanita Mendoza MD  Last physical: 5/31/2019 Last MTM visit: Visit date not found   Next visit within 3 mo: Visit date not found  Next physical within 3 mo: Visit date not found  Prescriber OR PCP: Juanita Mendoza MD  Last diagnosis associated with med order: 1. Essential hypertension  - hydroCHLOROthiazide (HYDRODIURIL) 25 MG tablet; Take 1 tablet (25 mg total) by mouth daily.  Dispense: 30 tablet; Refill: 0    If protocol passes may refill for 12 months if within 3 months of last provider visit (or a total of 15 months).             Passed - Serum Potassium in past 12 months      Lab Results   Component Value Date    Potassium 4.2 10/13/2020             Passed - Serum Sodium in past 12 months      Lab Results   Component Value Date    Sodium 141 10/13/2020             Passed - Blood pressure on file in past 12 months     BP Readings from Last 1 Encounters:   12/01/20 (!) 138/100             Passed - Serum Creatinine in past 12 months      Creatinine   Date Value Ref Range Status   10/13/2020 0.76 0.60 - 1.10 mg/dL Final

## 2021-06-25 NOTE — TELEPHONE ENCOUNTER
Pt reports that she found a tick latched on to her right upper arm.  She believes it is a deer tick, and said that it was probably latched on for 6 days.  She removed the body of the tick, but the head is still embedded.  Pt's arm is red. She denies any other signs of infection.  Pt afebrile, and denies any headache, or bullseye rash.   Per protocol, pt advised to be seen by a provider today.  Pt transferred to scheduling to set up an appointment, and advised to go to urgent care if no available appointments.  Pt verbalized understanding.  Melba Hoang RN 06/04/21 2:01 PM  [facility] Nurse Advisor      Reason for Disposition    Can't remove tick's head that was broken off in the skin (after using Care Advice)    Additional Information    Negative: Not a tick bite    Negative: Patient sounds very sick or weak to the triager    Negative: Fever or severe headache occurs, 2 to 14 days following the bite    Negative: Widespread rash occurs, 2 to 14 days following the bite    Negative: Can't remove live tick (after using Care Advice)    Protocols used: TICK BITE-A-OH    COVID 19 Nurse Triage Plan/Patient Instructions    Please be aware that novel coronavirus (COVID-19) may be circulating in the community. If you develop symptoms such as fever, cough, or SOB or if you have concerns about the presence of another infection including coronavirus (COVID-19), please contact your health care provider or visit [email or web address].     Disposition/Instructions    In-Person Visit with provider recommended. Reference Visit Selection Guide.    Thank you for taking steps to prevent the spread of this virus.  o Limit your contact with others.  o Wear a simple mask to cover your cough.  o Wash your hands well and often.    Resources    [facility]: About COVID-19: [email or web address]    CDC: What to Do If You're Sick: www.cdc.gov/coronavirus/2019-ncov/about/steps-when-sick.html    CDC: Ending Home Isolation:  www.cdc.gov/coronavirus/2019-ncov/hcp/disposition-in-home-patients.html     CDC: Caring for Someone: www.cdc.gov/coronavirus/2019-ncov/if-you-are-sick/care-for-someone.html     [facility]: Interim Guidance for Hospital Discharge to Home: [email or web address]/diseases/coronavirus/hcp/hospdischarge.pdf    [facility] clinical trials (COVID-19 research studies): [email or web address]-clinical-trials     Below are the COVID-19 hotlines at the [place] [facility] [employer] [facility] ([facility]). Interpreters are available.   o For health questions: Call 065-371-1759 or 1-382.506.7819 (7 a.m. to 7 p.m.)  o For questions about schools and childcare: Call 162-958-8841 or 1-272.908.1246 (7 a.m. to 7 p.m.)

## 2021-06-26 NOTE — PROGRESS NOTES
"  Head taken out.   Assessment & Plan     Tick bite, initial encounter  Exam findings were discussed and remaining parts were removed as noted.   Discussed treating preventatively for primary lymes, given the duration of exposure.     Plan:     - doxycycline (MONODOX) 100 MG capsule  Dispense: 28 capsule; Refill: 0                     No follow-ups on file.    Connor Packer MD  [facility]   Zaida Stewart is 60 y.o. and presents today for tick bite which may have been exposed for the past 6 days on her right biceps.the site has turned red and has managed to get rid of part of the tachycardia but appears that the head might still be embedded in the skin.     She denies any fever, chills, arthralgia, chest pains, palpitations or shortness of breath        Objective    /90   Pulse 76   Ht 5' 8\" (1.727 m)   Wt (!) 257 lb (116.6 kg)   SpO2 97%   BMI 39.08 kg/m    Body mass index is 39.08 kg/m .     Physical Exam  Gen: well hydrated, NAD  Skin: dime size round erythematous rash on the right biceps. The FB ( tick particle ) noted and removed with an 18 gauge needle.               "

## 2021-06-27 ENCOUNTER — HEALTH MAINTENANCE LETTER (OUTPATIENT)
Age: 61
End: 2021-06-27

## 2021-07-06 VITALS
HEART RATE: 76 BPM | DIASTOLIC BLOOD PRESSURE: 90 MMHG | WEIGHT: 257 LBS | BODY MASS INDEX: 38.95 KG/M2 | HEIGHT: 68 IN | SYSTOLIC BLOOD PRESSURE: 136 MMHG | OXYGEN SATURATION: 97 %

## 2021-07-09 ENCOUNTER — NURSE TRIAGE (OUTPATIENT)
Dept: NURSING | Facility: CLINIC | Age: 61
End: 2021-07-09
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
## 2021-10-17 ENCOUNTER — HEALTH MAINTENANCE LETTER (OUTPATIENT)
Age: 61
End: 2021-10-17

## 2021-12-18 DIAGNOSIS — I10 ESSENTIAL HYPERTENSION: ICD-10-CM

## 2021-12-20 RX ORDER — LOSARTAN POTASSIUM 100 MG/1
TABLET ORAL
Qty: 90 TABLET | Refills: 0 | Status: SHIPPED | OUTPATIENT
Start: 2021-12-20 | End: 2022-01-05

## 2022-01-05 ENCOUNTER — OFFICE VISIT (OUTPATIENT)
Dept: FAMILY MEDICINE | Facility: CLINIC | Age: 62
End: 2022-01-05
Payer: COMMERCIAL

## 2022-01-05 VITALS
BODY MASS INDEX: 38.36 KG/M2 | TEMPERATURE: 96.7 F | SYSTOLIC BLOOD PRESSURE: 145 MMHG | RESPIRATION RATE: 16 BRPM | HEART RATE: 86 BPM | HEIGHT: 68 IN | WEIGHT: 253.13 LBS | DIASTOLIC BLOOD PRESSURE: 89 MMHG

## 2022-01-05 DIAGNOSIS — Z00.00 ENCOUNTER FOR PREVENTATIVE ADULT HEALTH CARE EXAMINATION: Primary | ICD-10-CM

## 2022-01-05 DIAGNOSIS — L98.9 SKIN LESION: ICD-10-CM

## 2022-01-05 DIAGNOSIS — I10 ESSENTIAL HYPERTENSION: ICD-10-CM

## 2022-01-05 DIAGNOSIS — Z78.0 MENOPAUSE: ICD-10-CM

## 2022-01-05 DIAGNOSIS — N84.1 CERVICAL POLYP: ICD-10-CM

## 2022-01-05 DIAGNOSIS — D12.6 ADENOMATOUS POLYP OF COLON, UNSPECIFIED PART OF COLON: ICD-10-CM

## 2022-01-05 DIAGNOSIS — Z12.31 VISIT FOR SCREENING MAMMOGRAM: ICD-10-CM

## 2022-01-05 LAB
ALBUMIN SERPL-MCNC: 3.9 G/DL (ref 3.5–5)
ALP SERPL-CCNC: 80 U/L (ref 45–120)
ALT SERPL W P-5'-P-CCNC: 42 U/L (ref 0–45)
ANION GAP SERPL CALCULATED.3IONS-SCNC: 9 MMOL/L (ref 5–18)
AST SERPL W P-5'-P-CCNC: 33 U/L (ref 0–40)
BILIRUB SERPL-MCNC: 0.4 MG/DL (ref 0–1)
BUN SERPL-MCNC: 15 MG/DL (ref 8–22)
CALCIUM SERPL-MCNC: 9.5 MG/DL (ref 8.5–10.5)
CHLORIDE BLD-SCNC: 103 MMOL/L (ref 98–107)
CHOLEST SERPL-MCNC: 232 MG/DL
CO2 SERPL-SCNC: 24 MMOL/L (ref 22–31)
CREAT SERPL-MCNC: 0.69 MG/DL (ref 0.6–1.1)
ERYTHROCYTE [DISTWIDTH] IN BLOOD BY AUTOMATED COUNT: 11.7 % (ref 10–15)
FASTING STATUS PATIENT QL REPORTED: ABNORMAL
GFR SERPL CREATININE-BSD FRML MDRD: >90 ML/MIN/1.73M2
GLUCOSE BLD-MCNC: 99 MG/DL (ref 70–125)
HCT VFR BLD AUTO: 42 % (ref 35–47)
HDLC SERPL-MCNC: 58 MG/DL
HGB BLD-MCNC: 13.6 G/DL (ref 11.7–15.7)
LDLC SERPL CALC-MCNC: 149 MG/DL
MCH RBC QN AUTO: 31.7 PG (ref 26.5–33)
MCHC RBC AUTO-ENTMCNC: 32.4 G/DL (ref 31.5–36.5)
MCV RBC AUTO: 98 FL (ref 78–100)
PLATELET # BLD AUTO: 243 10E3/UL (ref 150–450)
POTASSIUM BLD-SCNC: 3.9 MMOL/L (ref 3.5–5)
PROT SERPL-MCNC: 7.4 G/DL (ref 6–8)
RBC # BLD AUTO: 4.29 10E6/UL (ref 3.8–5.2)
SODIUM SERPL-SCNC: 136 MMOL/L (ref 136–145)
TRIGL SERPL-MCNC: 123 MG/DL
WBC # BLD AUTO: 6.9 10E3/UL (ref 4–11)

## 2022-01-05 PROCEDURE — G0124 SCREEN C/V THIN LAYER BY MD: HCPCS | Performed by: PATHOLOGY

## 2022-01-05 PROCEDURE — 88305 TISSUE EXAM BY PATHOLOGIST: CPT | Performed by: PATHOLOGY

## 2022-01-05 PROCEDURE — 80053 COMPREHEN METABOLIC PANEL: CPT | Performed by: FAMILY MEDICINE

## 2022-01-05 PROCEDURE — 88142 CYTOPATH C/V THIN LAYER: CPT | Performed by: FAMILY MEDICINE

## 2022-01-05 PROCEDURE — 36415 COLL VENOUS BLD VENIPUNCTURE: CPT | Performed by: FAMILY MEDICINE

## 2022-01-05 PROCEDURE — 87624 HPV HI-RISK TYP POOLED RSLT: CPT | Performed by: FAMILY MEDICINE

## 2022-01-05 PROCEDURE — 99213 OFFICE O/P EST LOW 20 MIN: CPT | Performed by: FAMILY MEDICINE

## 2022-01-05 PROCEDURE — 80061 LIPID PANEL: CPT | Performed by: FAMILY MEDICINE

## 2022-01-05 PROCEDURE — 85027 COMPLETE CBC AUTOMATED: CPT | Performed by: FAMILY MEDICINE

## 2022-01-05 RX ORDER — HYDROCHLOROTHIAZIDE 12.5 MG/1
12.5 TABLET ORAL DAILY
Qty: 90 TABLET | Refills: 3 | Status: SHIPPED | OUTPATIENT
Start: 2022-01-05 | End: 2023-01-25

## 2022-01-05 RX ORDER — LOSARTAN POTASSIUM 100 MG/1
100 TABLET ORAL DAILY
Qty: 90 TABLET | Refills: 3 | Status: SHIPPED | OUTPATIENT
Start: 2022-01-05 | End: 2023-01-10

## 2022-01-05 RX ORDER — HYDROCHLOROTHIAZIDE 25 MG/1
25 TABLET ORAL DAILY
Qty: 90 TABLET | Refills: 3 | Status: SHIPPED | OUTPATIENT
Start: 2022-01-05 | End: 2022-01-07

## 2022-01-05 ASSESSMENT — MIFFLIN-ST. JEOR: SCORE: 1761.67

## 2022-01-05 NOTE — PROGRESS NOTES
SUBJECTIVE:   CC: Zaida Stewart is an 61 year old woman who presents for preventive health visit.       Patient has been advised of split billing requirements and indicates understanding: Yes  Healthy Habits:     Getting at least 3 servings of Calcium per day:  Yes    Bi-annual eye exam:  Yes    Dental care twice a year:  Yes    Sleep apnea or symptoms of sleep apnea:  None    Diet:  Regular (no restrictions)    Frequency of exercise:  1 day/week    Duration of exercise:  30-45 minutes    Taking medications regularly:  Yes    Medication side effects:  Not applicable    PHQ-2 Total Score: 1    Additional concerns today:  No    High blood pressure: She does currently take her medications at night.  It is uncontrolled.  She does agree to medication adjustment.    Menopause: About 2013.  No concerns of postmenopausal bleeding.    Left hip pain:  Decreased mobility and seeing chiropractor and doing yoga and helps.  Can tolerate biking.  I have brought this topic up with her mobility and exercise but she had not brought this up on her own so this topic should not have a split bill.    Cervical polyp: This was seen on exam today and patient is agreeable to removal.  She does know about split billing.      Health Maintenance   Topic Date Due     ANNUAL REVIEW OF HM ORDERS  Today     HIV SCREENING  NA     HEPATITIS C SCREENING  NA     ZOSTER IMMUNIZATION (1 of 2) If you are interested in the new shingles shot, Shingrix, please check with insurance for coverage either in clinic or at a pharmacy. It is a 2 shot series 2-6 months apart.      LUNG CANCER SCREENING  Quit 35 years ago     PREVENTIVE CARE VISIT  Today     HPV TEST  Today     PAP  Today     MAMMO SCREENING  Ordered     LIPID  Today     DTAP/TDAP/TD IMMUNIZATION (2 - Td or Tdap) 02/17/2025     ADVANCE CARE PLANNING   we would like a copy please     COLORECTAL CANCER SCREENING  11/08/2022, ordered     PHQ-2  Completed     INFLUENZA VACCINE  Completed     COVID-19  Vaccine  Completed     Pneumococcal Vaccine: Pediatrics (0 to 5 Years) and At-Risk Patients (6 to 64 Years)  Age 65     IPV IMMUNIZATION  Aged Out     MENINGITIS IMMUNIZATION  Aged Out     HEPATITIS B IMMUNIZATION  Declined today     Dexa scan:  Ordered            Today's PHQ-2 Score:   PHQ-2 ( 1999 Pfizer) 1/5/2022   Q1: Little interest or pleasure in doing things 1   Q2: Feeling down, depressed or hopeless 0   PHQ-2 Score 1   Q1: Little interest or pleasure in doing things Several days   Q2: Feeling down, depressed or hopeless Not at all   PHQ-2 Score 1       Abuse: Current or Past (Physical, Sexual or Emotional) - No  Do you feel safe in your environment? Yes    Have you ever done Advance Care Planning? (For example, a Health Directive, POLST, or a discussion with a medical provider or your loved ones about your wishes): Yes, patient states has an Advance Care Planning document and will bring a copy to the clinic.    Social History     Tobacco Use     Smoking status: Former Smoker     Packs/day: 0.50     Years: 7.00     Pack years: 3.50     Types: Cigarettes, Cigarettes     Smokeless tobacco: Never Used   Substance Use Topics     Alcohol use: Yes         Alcohol Use 1/5/2022   Prescreen: >3 drinks/day or >7 drinks/week? No       Reviewed orders with patient.  Reviewed health maintenance and updated orders accordingly - Yes  Lab work is in process    Breast Cancer Screening:    FHS-7:   Breast CA Risk Assessment (FHS-7) 1/5/2022   Did any of your first-degree relatives have breast or ovarian cancer? Yes   Did any of your relatives have bilateral breast cancer? Unknown   Did any man in your family have breast cancer? No   Did any woman in your family have breast and ovarian cancer? Unknown   Did any woman in your family have breast cancer before age 50 y? Yes   Do you have 2 or more relatives with breast and/or ovarian cancer? No   Do you have 2 or more relatives with breast and/or bowel cancer? No       Mammogram  "Screening: Recommended mammography every 1-2 years with patient discussion and risk factor consideration  Pertinent mammograms are reviewed under the imaging tab.    History of abnormal Pap smear: NO - age 30- 65 PAP every 3 years recommended  PAP / HPV 12/17/2015   PAP Negative for squamous intraepithelial lesion or malignancy  Electronically signed by Shireen Quiroz CT (ASCP) on 12/30/2015 at  3:04 PM       Reviewed and updated as needed this visit by clinical staff  Tobacco   Meds             Reviewed and updated as needed this visit by Provider                   Review of Systems  CONSTITUTIONAL: NEGATIVE for fever, chills, change in weight  INTEGUMENTARY/SKIN: NEGATIVE for worrisome rashes, moles or lesions  EYES: NEGATIVE for vision changes or irritation  ENT: NEGATIVE for ear, mouth and throat problems  RESP: NEGATIVE for significant cough or SOB  BREAST: NEGATIVE for masses, tenderness or discharge  CV: NEGATIVE for chest pain, palpitations or peripheral edema  GI: NEGATIVE for nausea, abdominal pain, heartburn, or change in bowel habits  : NEGATIVE for unusual urinary or vaginal symptoms. No vaginal bleeding.  MUSCULOSKELETAL: NEGATIVE for significant arthralgias or myalgia  NEURO: NEGATIVE for weakness, dizziness or paresthesias  PSYCHIATRIC: NEGATIVE for changes in mood or affect      OBJECTIVE:   BP (!) 145/89 (BP Location: Left arm, Patient Position: Sitting, Cuff Size: Adult Large)   Pulse 86   Temp (!) 96.7  F (35.9  C) (Oral)   Resp 16   Ht 1.727 m (5' 8\")   Wt 114.8 kg (253 lb 2 oz)   BMI 38.49 kg/m    Physical Exam  GENERAL: healthy, alert and no distress  EYES: Eyes grossly normal to inspection, PERRL and conjunctivae and sclerae normal  HENT: ear canals and TM's normal, nose and mouth without ulcers or lesions  NECK: no adenopathy, no asymmetry, masses, or scars and thyroid normal to palpation  RESP: lungs clear to auscultation - no rales, rhonchi or wheezes  BREAST: normal without " masses, tenderness or nipple discharge and no palpable axillary masses or adenopathy  CV: regular rate and rhythm, normal S1 S2, no S3 or S4, no murmur, click or rub, no peripheral edema and peripheral pulses strong  ABDOMEN: soft, nontender, no hepatosplenomegaly, no masses and bowel sounds normal   (female): normal female external genitalia, normal urethral meatus, vaginal mucosa pink, moist, well rugated, unable to palpate uterus and ovaries secondary to body habitus, about a 7 mm cervical polyp many external os  MS: no gross musculoskeletal defects noted, no edema  SKIN: no suspicious lesions or rashes  NEURO: Normal strength and tone, mentation intact and speech normal  PSYCH: mentation appears normal, affect normal/bright    Diagnostic Test Results:  Labs reviewed in Epic    ASSESSMENT/PLAN:       ICD-10-CM    1. Encounter for preventative adult health care examination  Z00.00 Pap screen with HPV - recommended age 30 - 65 years     HPV High Risk Types DNA Cervical   2. Essential hypertension  I10 Comprehensive metabolic panel     Lipid Profile     CBC with platelets     hydrochlorothiazide (HYDRODIURIL) 25 MG tablet     losartan (COZAAR) 100 MG tablet     hydrochlorothiazide (HYDRODIURIL) 12.5 MG tablet     Comprehensive metabolic panel     Lipid Profile     CBC with platelets   3. Adenomatous polyp of colon, unspecified part of colon  D12.6 Adult Gastro Ref - Procedure Only   4. Visit for screening mammogram  Z12.31 *MA Screening Digital Bilateral   5. Menopause  Z78.0 DX Hip/Pelvis/Spine   6. Cervical polyp  N84.1 Surgical Pathology Exam   7. Skin lesion  L98.9 Adult Dermatology Referral     If you are interested in the new shingles shot, Shingrix, please check with insurance for coverage either in clinic or at a pharmacy. It is a 2 shot series 2-6 months apart.     Declined Hepatitis A and B shots at this time.    For you next physical, set with an open provider like Dr. Rowley or Dr. Ackerman.    Change  "the BP meds to the morning.    The uncontrolled hypertension, increase Hydrochlorothiazide to 37.5 mg by taking a 25 mg tab and a 12.5 mg tab together in the morning.    We will help set a blood pressure check in 2 weeks here.    Sees and eye specialist.    Recommend healthy diet and exercise as able.  Has treadmill and rowing machine.    Bariatric or weight loss referral if you wish.      Seeing chiropractor for left hip pain.  Let us know if you wish to see an orthopedic surgeon.    Today I removed a cervical polyp.  We will send that to pathology and send you the results via [facility] within 1 to 2 weeks.    I also placed a referral to [facility] dermatology to look at the skin lesion on your left hand and ask for a full body skin check.    Cervical polyp was removed without difficulty by using a ring forceps and twisting it off at the base.  This will be sent to pathology hemostasis was achieved with silver nitrate sticks.    Patient has been advised of split billing requirements and indicates understanding: Yes  COUNSELING:  Reviewed preventive health counseling, as reflected in patient instructions       Regular exercise       Healthy diet/nutrition    Estimated body mass index is 38.49 kg/m  as calculated from the following:    Height as of this encounter: 1.727 m (5' 8\").    Weight as of this encounter: 114.8 kg (253 lb 2 oz).    Weight management plan: Discussed healthy diet and exercise guidelines    She reports that she has quit smoking. Her smoking use included cigarettes and cigarettes. She has a 3.50 pack-year smoking history. She has never used smokeless tobacco.      Counseling Resources:  ATP IV Guidelines  Pooled Cohorts Equation Calculator  Breast Cancer Risk Calculator  BRCA-Related Cancer Risk Assessment: FHS-7 Tool  FRAX Risk Assessment  ICSI Preventive Guidelines  Dietary Guidelines for Americans, 2010  USDA's MyPlate  ASA Prophylaxis  Lung CA Screening    Sherita Celestin MD  [facility] " [facility]

## 2022-01-05 NOTE — PATIENT INSTRUCTIONS
If you are interested in the new shingles shot, Shingrix, please check with insurance for coverage either in clinic or at a pharmacy. It is a 2 shot series 2-6 months apart.     Declined Hepatitis A and B shots at this time.    For you next physical, set with an open provider like Dr. Rowley or Dr. Ackerman.    Change the BP meds to the morning.    Increase Hydrochlorothiazide to 37.5 mg by taking a 25 mg tab and a 12.5 mg tab together in the morning.    We will help set a blood pressure check in 2 weeks here.    Sees and eye specialist.    Recommend healthy diet and exercise as able.  Has treadmill and rowing machine.    Bariatric or weight loss referral if you wish.      Seeing chiropractor for left hip pain.  Let us know if you wish to see an orthopedic surgeon.    Today I removed a cervical polyp.  We will send that to pathology and send you the results via [facility] within 1 to 2 weeks.    I also placed a referral to [facility] dermatology to look at the skin lesion on your left hand and ask for a full body skin check.    Cervical polyp was removed without difficulty by using a ring forceps and twisting it off at the base.  This will be sent to pathology hemostasis was achieved with silver nitrate sticks.      Health Maintenance   Topic Date Due     ANNUAL REVIEW OF HM ORDERS  Today     HIV SCREENING  NA     HEPATITIS C SCREENING  NA     ZOSTER IMMUNIZATION (1 of 2) If you are interested in the new shingles shot, Shingrix, please check with insurance for coverage either in clinic or at a pharmacy. It is a 2 shot series 2-6 months apart.      LUNG CANCER SCREENING  Quit 35 years ago     PREVENTIVE CARE VISIT  Today     HPV TEST  Today     PAP  Today     MAMMO SCREENING  Ordered     LIPID  Today     DTAP/TDAP/TD IMMUNIZATION (2 - Td or Tdap) 02/17/2025     ADVANCE CARE PLANNING   we would like a copy please     COLORECTAL CANCER SCREENING  11/08/2022, ordered     PHQ-2  Completed     INFLUENZA VACCINE  Completed      COVID-19 Vaccine  Completed     Pneumococcal Vaccine: Pediatrics (0 to 5 Years) and At-Risk Patients (6 to 64 Years)  Age 65     IPV IMMUNIZATION  Aged Out     MENINGITIS IMMUNIZATION  Aged Out     HEPATITIS B IMMUNIZATION  Declined today     Dexa scan:  Ordered

## 2022-01-06 ENCOUNTER — NURSE TRIAGE (OUTPATIENT)
Dept: NURSING | Facility: CLINIC | Age: 62
End: 2022-01-06
Payer: COMMERCIAL

## 2022-01-06 NOTE — TELEPHONE ENCOUNTER
Patient calling reporting known exposure to COVID 19 on 1/4/22.  Patient is fully vaccinated for COVID 19 including booster dose.    Denies current symptoms.    Disposition to call provider when office is open.   Patient will complete E-Visit.    Sol oJhn RN  [facility]      COVID 19 Nurse Triage Plan/Patient Instructions    Please be aware that novel coronavirus (COVID-19) may be circulating in the community. If you develop symptoms such as fever, cough, or SOB or if you have concerns about the presence of another infection including coronavirus (COVID-19), please contact your health care provider or visit [email or web address].     Disposition/Instructions    Virtual Visit with provider recommended. Reference Visit Selection Guide.    Thank you for taking steps to prevent the spread of this virus.  o Limit your contact with others.  o Wear a simple mask to cover your cough.  o Wash your hands well and often.    Resources    [facility]: About COVID-19: [email or web address]    CDC: What to Do If You're Sick: www.cdc.gov/coronavirus/2019-ncov/about/steps-when-sick.html    CDC: Ending Home Isolation: www.cdc.gov/coronavirus/2019-ncov/hcp/disposition-in-home-patients.html     CDC: Caring for Someone: www.cdc.gov/coronavirus/2019-ncov/if-you-are-sick/care-for-someone.html     [facility]: Interim Guidance for Hospital Discharge to Home: www.health.[email or web address].[place]/diseases/coronavirus/hcp/hospdischarge.pdf    [facility] clinical trials (COVID-19 research studies): [email or web address].[facility]-clinical-trials     Below are the COVID-19 hotlines at the [place] Department of Health ([facility]). Interpreters are available.   o For health questions: Call 194-545-6978 or 1-786.391.9996 (7 a.m. to 7 p.m.)  o For questions about schools and childcare: Call 897-188-6574 or 1-883.269.7461 (7 a.m. to 7 p.m.)                       Reason for Disposition    [1] CLOSE CONTACT COVID-19 EXPOSURE  within last 14 days AND [2] NO symptoms    Additional Information    Negative: COVID-19 lab test positive    Negative: [1] Lives with someone known to have influenza (flu test positive) AND [2] flu-like symptoms (e.g., cough, runny nose, sore throat, SOB; with or without fever)    Negative: [1] Symptoms of COVID-19 (e.g., cough, fever, SOB, or others) AND [2] HCP diagnosed COVID-19 based on symptoms    Negative: [1] Symptoms of COVID-19 (e.g., cough, fever, SOB, or others) AND [2] lives in an area with community spread    Negative: [1] Symptoms of COVID-19 (e.g., cough, fever, SOB, or others) AND [2] within 14 days of EXPOSURE (close contact) with diagnosed or suspected COVID-19 patient    Negative: [1] Symptoms of COVID-19 (e.g., cough, fever, SOB, or others) AND [2] within 14 days of travel from high-risk area for COVID-19 community spread (identified by CDC)    Negative: [1] Difficulty breathing (shortness of breath) occurs AND [2] onset > 14 days after COVID-19 EXPOSURE (Close Contact)    Negative: [1] Dry cough occurs AND [2] onset > 14 days after COVID-19 EXPOSURE    Negative: [1] Wet cough (i.e., white-yellow, yellow, green, or judy colored sputum) AND [2] onset > 14 days after COVID-19 EXPOSURE    Negative: [1] Common cold symptoms AND [2] onset > 14 days after COVID-19 EXPOSURE    Negative: COVID-19 vaccine reaction suspected (e.g., fever, headache, muscle aches) occurring during days 1-3 after getting vaccine    Negative: COVID-19 vaccine, questions about    Negative: [1] CLOSE CONTACT COVID-19 EXPOSURE within last 14 days AND [2] needs COVID-19 lab test to return to work AND [3] NO symptoms    Negative: [1] CLOSE CONTACT COVID-19 EXPOSURE within last 14 days AND [2] exposed person is a  (e.g., police or paramedic) AND [3] NO symptoms    Negative: [1] CLOSE CONTACT COVID-19 EXPOSURE within last 14 days AND [2] exposed person is a healthcare worker who was NOT using all recommended personal  protective equipment (e.g., a respirator-N95 mask, eye protection, gloves, and gown) AND [3] NO symptoms    Negative: [1] Living or working in a correctional facility, long-term care facility, or shelter (i.e., congregate setting; densely populated) AND [2] where an outbreak has occurred AND [3] NO symptoms    Protocols used: CORONAVIRUS (COVID-19) EXPOSURE-A- 8.25.2021

## 2022-01-07 DIAGNOSIS — I10 ESSENTIAL HYPERTENSION: ICD-10-CM

## 2022-01-07 LAB
PATH REPORT.COMMENTS IMP SPEC: NORMAL
PATH REPORT.FINAL DX SPEC: NORMAL
PATH REPORT.GROSS SPEC: NORMAL
PATH REPORT.MICROSCOPIC SPEC OTHER STN: NORMAL
PATH REPORT.RELEVANT HX SPEC: NORMAL
PHOTO IMAGE: NORMAL

## 2022-01-07 RX ORDER — HYDROCHLOROTHIAZIDE 25 MG/1
TABLET ORAL
Qty: 90 TABLET | Refills: 3 | Status: SHIPPED | OUTPATIENT
Start: 2022-01-07 | End: 2023-01-25

## 2022-01-10 LAB
BKR LAB AP GYN ADEQUACY: NORMAL
BKR LAB AP GYN INTERPRETATION: NORMAL
BKR LAB AP GYN OTHER FINDINGS: NORMAL
BKR LAB AP HPV REFLEX: NORMAL
BKR LAB AP PREVIOUS ABNORMAL: NORMAL
PATH REPORT.COMMENTS IMP SPEC: NORMAL
PATH REPORT.COMMENTS IMP SPEC: NORMAL
PATH REPORT.RELEVANT HX SPEC: NORMAL

## 2022-01-12 LAB
HUMAN PAPILLOMA VIRUS 16 DNA: NEGATIVE
HUMAN PAPILLOMA VIRUS 18 DNA: NEGATIVE
HUMAN PAPILLOMA VIRUS FINAL DIAGNOSIS: NORMAL
HUMAN PAPILLOMA VIRUS OTHER HR: NEGATIVE

## 2022-01-21 ENCOUNTER — ALLIED HEALTH/NURSE VISIT (OUTPATIENT)
Dept: FAMILY MEDICINE | Facility: CLINIC | Age: 62
End: 2022-01-21
Payer: COMMERCIAL

## 2022-01-21 VITALS — SYSTOLIC BLOOD PRESSURE: 129 MMHG | DIASTOLIC BLOOD PRESSURE: 84 MMHG | HEART RATE: 76 BPM

## 2022-01-21 DIAGNOSIS — I10 ESSENTIAL HYPERTENSION: Primary | ICD-10-CM

## 2022-01-21 PROCEDURE — 99207 PR NO CHARGE NURSE ONLY: CPT

## 2022-01-21 NOTE — PROGRESS NOTES
I met with Zaida Stewart at the request of Dr. Celestin to recheck her blood pressure.  Blood pressure medications on the med list were reviewed with patient.    Patient has taken all medications as per usual regimen: Yes  Patient reports tolerating them without any issues or concerns: Yes    Vitals:    01/21/22 1307 01/21/22 1327   BP: (!) 143/90 129/84   Pulse: 83 76       After 5 minutes, the patient's blood pressure was <140/90, the previous encounter was reviewed, recorded blood pressure below 140/90. Patient was discharged and the note will be sent to the provider for final review.

## 2022-01-25 ENCOUNTER — TELEPHONE (OUTPATIENT)
Dept: FAMILY MEDICINE | Facility: CLINIC | Age: 62
End: 2022-01-25
Payer: COMMERCIAL

## 2022-01-25 NOTE — TELEPHONE ENCOUNTER
Informed patient of message below      Sherita Celestin MD at 1/21/2022  1:00 PM    Status: Signed      Second blood pressure reading is normal.  Continue same medications.

## 2022-07-23 ENCOUNTER — HEALTH MAINTENANCE LETTER (OUTPATIENT)
Age: 62
End: 2022-07-23

## 2022-09-06 ENCOUNTER — ANCILLARY PROCEDURE (OUTPATIENT)
Dept: MAMMOGRAPHY | Facility: HOSPITAL | Age: 62
End: 2022-09-06
Attending: FAMILY MEDICINE
Payer: COMMERCIAL

## 2022-09-06 DIAGNOSIS — Z12.31 VISIT FOR SCREENING MAMMOGRAM: ICD-10-CM

## 2022-09-06 PROCEDURE — 77067 SCR MAMMO BI INCL CAD: CPT

## 2022-09-12 ENCOUNTER — ANCILLARY PROCEDURE (OUTPATIENT)
Dept: MAMMOGRAPHY | Facility: CLINIC | Age: 62
End: 2022-09-12
Attending: FAMILY MEDICINE
Payer: COMMERCIAL

## 2022-09-12 DIAGNOSIS — N63.0 BREAST MASS: ICD-10-CM

## 2022-09-12 PROCEDURE — 77061 BREAST TOMOSYNTHESIS UNI: CPT | Mod: LT

## 2022-09-12 PROCEDURE — 76642 ULTRASOUND BREAST LIMITED: CPT | Mod: LT

## 2022-10-01 ENCOUNTER — HEALTH MAINTENANCE LETTER (OUTPATIENT)
Age: 62
End: 2022-10-01

## 2022-10-31 ENCOUNTER — OFFICE VISIT (OUTPATIENT)
Dept: FAMILY MEDICINE | Facility: CLINIC | Age: 62
End: 2022-10-31
Payer: COMMERCIAL

## 2022-10-31 VITALS
HEART RATE: 107 BPM | DIASTOLIC BLOOD PRESSURE: 82 MMHG | HEIGHT: 68 IN | BODY MASS INDEX: 33.83 KG/M2 | WEIGHT: 223.25 LBS | TEMPERATURE: 97.7 F | SYSTOLIC BLOOD PRESSURE: 128 MMHG | RESPIRATION RATE: 16 BRPM

## 2022-10-31 DIAGNOSIS — Z01.818 PREOP GENERAL PHYSICAL EXAM: Primary | ICD-10-CM

## 2022-10-31 DIAGNOSIS — Z11.59 NEED FOR HEPATITIS C SCREENING TEST: ICD-10-CM

## 2022-10-31 DIAGNOSIS — Z11.4 SCREENING FOR HIV (HUMAN IMMUNODEFICIENCY VIRUS): ICD-10-CM

## 2022-10-31 DIAGNOSIS — H25.012 CORTICAL AGE-RELATED CATARACT OF LEFT EYE: ICD-10-CM

## 2022-10-31 DIAGNOSIS — I10 BENIGN ESSENTIAL HYPERTENSION: ICD-10-CM

## 2022-10-31 PROBLEM — N80.9 ENDOMETRIOSIS: Status: ACTIVE | Noted: 2022-10-31

## 2022-10-31 PROBLEM — H35.373 EPIRETINAL MEMBRANE (ERM) OF BOTH EYES: Status: ACTIVE | Noted: 2022-10-31

## 2022-10-31 PROCEDURE — 99214 OFFICE O/P EST MOD 30 MIN: CPT | Mod: 25 | Performed by: FAMILY MEDICINE

## 2022-10-31 PROCEDURE — 90471 IMMUNIZATION ADMIN: CPT | Performed by: FAMILY MEDICINE

## 2022-10-31 PROCEDURE — 86803 HEPATITIS C AB TEST: CPT | Performed by: FAMILY MEDICINE

## 2022-10-31 PROCEDURE — 36415 COLL VENOUS BLD VENIPUNCTURE: CPT | Performed by: FAMILY MEDICINE

## 2022-10-31 PROCEDURE — 90682 RIV4 VACC RECOMBINANT DNA IM: CPT | Performed by: FAMILY MEDICINE

## 2022-10-31 PROCEDURE — 87389 HIV-1 AG W/HIV-1&-2 AB AG IA: CPT | Performed by: FAMILY MEDICINE

## 2022-10-31 NOTE — PROGRESS NOTES
[facility]  [address] [facility] [address]  Phone: [phone]  Fax: [phone]  Primary Provider: Sherita Celestin  Pre-op Performing Provider: SHERITA CELESTIN      PREOPERATIVE EVALUATION:  Today's date: 10/31/2022    Zaida Stewart is a 62 year old female who presents for a preoperative evaluation.    Surgical Information:  Surgery/Procedure: left Cataract   Surgery Location: [facility]  Surgeon: Dr Palacios  Surgery Date: 11/10/22  Time of Surgery: unknown  Where patient plans to recover: At home with family     Fax number for surgical facility: Printed for patient and will fax    Type of Anesthesia Anticipated: Local with MAC    Assessment & Plan     The proposed surgical procedure is considered LOW risk.      ICD-10-CM    1. Preop general physical exam  Z01.818       2. Screening for HIV (human immunodeficiency virus)  Z11.4 HIV Antigen Antibody Combo      3. Need for hepatitis C screening test  Z11.59 Hepatitis C Screen Reflex to HCV RNA Quant and Genotype      4. Cortical age-related cataract of left eye  H25.012       5. Benign essential hypertension  I10         At your wellness exam in January I had referred her to dermatology for a skin lesion on your hand and the diagnosis I used was L98.9.    If you are interested in the new shingles shot, Shingrix, please check with insurance for coverage either in clinic or at a pharmacy. It is a 2 shot series 2-6 months apart.     Flu shot today.    Colonoscopy set for Jan.    Please set your annual wellness for Jan.    No aspirin or vitamins for 1 week before surgery, Tylenol is safe.           Risks and Recommendations:  The patient has the following additional risks and recommendations for perioperative complications:   - No identified additional risk factors other than previously addressed    Medication Instructions:  see Plan    RECOMMENDATION:  APPROVAL GIVEN to proceed with proposed procedure,  without further diagnostic evaluation.         30 minutes spent on the date of the encounter doing chart review, history and exam, documentation and further activities per the note        Subjective     HPI related to upcoming procedure: 62-year-old female with high blood pressure controlled on medication.  No history of heart or lung disease.  No history of stroke.  Cloudy vision in left eye for years.  Reduced night vision as well in left eye.      Preop Questions 10/31/2022   1. Have you ever had a heart attack or stroke? No   2. Have you ever had surgery on your heart or blood vessels, such as a stent placement, a coronary artery bypass, or surgery on an artery in your head, neck, heart, or legs? No   3. Do you have chest pain with activity? No   4. Do you have a history of  heart failure? No   5. Do you currently have a cold, bronchitis or symptoms of other infection? No   6. Do you have a cough, shortness of breath, or wheezing? No   7. Do you or anyone in your family have previous history of blood clots? None known   8. Do you or does anyone in your family have a serious bleeding problem such as prolonged bleeding following surgeries or cuts? No   9. Have you ever had problems with anemia or been told to take iron pills? No   10. Have you had any abnormal blood loss such as black, tarry or bloody stools, or abnormal vaginal bleeding? No   11. Have you ever had a blood transfusion? No   12. Are you willing to have a blood transfusion if it is medically needed before, during, or after your surgery? Yes   13. Have you or any of your relatives ever had problems with anesthesia? No   14. Do you have sleep apnea, excessive snoring or daytime drowsiness? No   15. Do you have any artifical heart valves or other implanted medical devices like a pacemaker, defibrillator, or continuous glucose monitor? No   16. Do you have artificial joints? No   17. Are you allergic to latex? No       Health Care Directive:  Patient  does not have a Health Care Directive or Living Will:  Has one    Preoperative Review of :  NA    Status of Chronic Conditions:  See problem list for active medical problems.  Problems all longstanding and stable, except as noted/documented.  See ROS for pertinent symptoms related to these conditions.      Review of Systems  CONSTITUTIONAL: NEGATIVE for fever, chills, change in weight  INTEGUMENTARY/SKIN: NEGATIVE for worrisome rashes, moles or lesions  EYES: see above and PMH  ENT/MOUTH: NEGATIVE for ear, mouth and throat problems  RESP: NEGATIVE for significant cough or SOB  CV: NEGATIVE for chest pain, palpitations or peripheral edema  GI: NEGATIVE for nausea, abdominal pain, heartburn, or change in bowel habits  : NEGATIVE for frequency, dysuria, or hematuria  MUSCULOSKELETAL: NEGATIVE for significant arthralgias or myalgia  NEURO: NEGATIVE for weakness, dizziness or paresthesias  ENDOCRINE: NEGATIVE for temperature intolerance, skin/hair changes  HEME: NEGATIVE for bleeding problems  PSYCHIATRIC: NEGATIVE for changes in mood or affect    Patient Active Problem List    Diagnosis Date Noted     Epiretinal membrane (ERM) of both eyes-Right > Left 10/31/2022     Priority: Medium     Endometriosis 10/31/2022     Priority: Medium     Benign essential hypertension 10/31/2022     Priority: Medium     Adenomatous colon polyp 01/05/2022     Priority: Medium     Obesity      Priority: Medium     Created by Conversion          No past medical history on file.  Past Surgical History:   Procedure Laterality Date     CATARACT EXTRACTION Right 2016     DILATION AND CURETTAGE       EYE SURGERY Bilateral     Laser surgery 2015 and 2017 for bilateral retinal tears     OTHER SURGICAL HISTORY  1992    laporoscopy for infertiltiy     Current Outpatient Medications   Medication Sig Dispense Refill     hydrochlorothiazide (HYDRODIURIL) 12.5 MG tablet Take 1 tablet (12.5 mg) by mouth daily 90 tablet 3     hydrochlorothiazide  "(HYDRODIURIL) 25 MG tablet TAKE 1 TABLET(25 MG) BY MOUTH DAILY 90 tablet 3     losartan (COZAAR) 100 MG tablet Take 1 tablet (100 mg) by mouth daily 90 tablet 3       Allergies   Allergen Reactions     Ibuprofen Swelling     Lisinopril Cough        Social History     Tobacco Use     Smoking status: Former     Packs/day: 0.50     Years: 7.00     Pack years: 3.50     Types: Cigarettes     Smokeless tobacco: Never   Substance Use Topics     Alcohol use: Yes     Family History   Problem Relation Age of Onset     Breast Cancer Mother 45     No Known Problems Father         fell and fractured skull     No Known Problems Brother      Arthritis Maternal Grandmother      Cerebrovascular Disease Paternal Grandfather      Breast Cancer Maternal Aunt 53         of COPD     Chronic Obstructive Pulmonary Disease Maternal Aunt      Coronary Artery Disease Paternal Aunt          of MI     History   Drug Use No         Objective     /82 (BP Location: Left arm, Patient Position: Sitting, Cuff Size: Adult Large)   Pulse 107   Temp 97.7  F (36.5  C) (Oral)   Resp 16   Ht 1.715 m (5' 7.5\")   Wt 101.3 kg (223 lb 4 oz)   BMI 34.45 kg/m      Physical Exam    GENERAL APPEARANCE: healthy, alert and no distress     EYES: EOMI, PERRL     HENT: ear canals and TM's normal and nose and mouth without ulcers or lesions     NECK: no adenopathy, no asymmetry, masses, or scars and thyroid normal to palpation     RESP: lungs clear to auscultation - no rales, rhonchi or wheezes     CV: regular rates and rhythm, normal S1 S2, no S3 or S4 and no murmur, click or rub     ABDOMEN:  soft, nontender, no HSM or masses and bowel sounds normal     MS: extremities normal- no gross deformities noted, no evidence of inflammation in joints, FROM in all extremities.     SKIN: no suspicious lesions or rashes     NEURO: Normal strength and tone, sensory exam grossly normal, mentation intact and speech normal     PSYCH: mentation appears normal. and " affect normal/bright     LYMPHATICS: No cervical adenopathy    Recent Labs   Lab Test 01/05/22  1557   HGB 13.6         POTASSIUM 3.9   CR 0.69        Diagnostics:  No labs were ordered during this visit.   No EKG required for low risk surgery (cataract, skin procedure, breast biopsy, etc).    Revised Cardiac Risk Index (RCRI):  The patient has the following serious cardiovascular risks for perioperative complications:   - No serious cardiac risks = 0 points     RCRI Interpretation: 0 points: Class I (very low risk - 0.4% complication rate)           Signed Electronically by: Sherita Celestin MD  Copy of this evaluation report is provided to requesting physician.

## 2022-10-31 NOTE — PATIENT INSTRUCTIONS
At your wellness exam in January I had referred her to dermatology for a skin lesion on your hand and the diagnosis I used was L98.9.    If you are interested in the new shingles shot, Shingrix, please check with insurance for coverage either in clinic or at a pharmacy. It is a 2 shot series 2-6 months apart.     Flu shot today.    Colonoscopy set for Jan.    Please set your annual wellness for Jan.    No aspirin or vitamins for 1 week before surgery, Tylenol is safe.

## 2022-11-01 LAB
HCV AB SERPL QL IA: NONREACTIVE
HIV 1+2 AB+HIV1 P24 AG SERPL QL IA: NONREACTIVE

## 2023-01-09 ENCOUNTER — TRANSFERRED RECORDS (OUTPATIENT)
Dept: HEALTH INFORMATION MANAGEMENT | Facility: CLINIC | Age: 63
End: 2023-01-09
Payer: COMMERCIAL

## 2023-01-09 DIAGNOSIS — I10 ESSENTIAL HYPERTENSION: ICD-10-CM

## 2023-01-10 RX ORDER — LOSARTAN POTASSIUM 100 MG/1
TABLET ORAL
Qty: 90 TABLET | Refills: 3 | Status: SHIPPED | OUTPATIENT
Start: 2023-01-10 | End: 2023-08-01

## 2023-01-10 NOTE — TELEPHONE ENCOUNTER
"Routing refill request to provider for review/approval because:  Labs out of range:  Need updated Potassium and Creatinine labs within last 12 months.    Last Written Prescription Date:  1/5/22  Last Fill Quantity: 90,  # refills: 3   Last office visit provider: 10/31/22    Requested Prescriptions   Pending Prescriptions Disp Refills     losartan (COZAAR) 100 MG tablet [Pharmacy Med Name: LOSARTAN 100MG TABLETS] 90 tablet 3     Sig: TAKE 1 TABLET(100 MG) BY MOUTH DAILY       Angiotensin-II Receptors Failed - 1/9/2023  5:21 AM        Failed - Normal serum creatinine on file in past 12 months     Recent Labs   Lab Test 01/05/22  1557   CR 0.69       Ok to refill medication if creatinine is low          Failed - Normal serum potassium on file in past 12 months     Recent Labs   Lab Test 01/05/22  1557   POTASSIUM 3.9                    Passed - Last blood pressure under 140/90 in past 12 months     BP Readings from Last 3 Encounters:   10/31/22 128/82   01/21/22 129/84   01/05/22 (!) 145/89                 Passed - Recent (12 mo) or future (30 days) visit within the authorizing provider's specialty     Patient has had an office visit with the authorizing provider or a provider within the authorizing providers department within the previous 12 mos or has a future within next 30 days. See \"Patient Info\" tab in inbasket, or \"Choose Columns\" in Meds & Orders section of the refill encounter.              Passed - Medication is active on med list        Passed - Patient is age 18 or older        Passed - No active pregnancy on record        Passed - No positive pregnancy test in past 12 months             Cira Duff RN 01/10/23 2:48 PM  "

## 2023-01-18 ENCOUNTER — TRANSFERRED RECORDS (OUTPATIENT)
Dept: HEALTH INFORMATION MANAGEMENT | Facility: CLINIC | Age: 63
End: 2023-01-18

## 2023-01-25 ENCOUNTER — OFFICE VISIT (OUTPATIENT)
Dept: FAMILY MEDICINE | Facility: CLINIC | Age: 63
End: 2023-01-25
Payer: COMMERCIAL

## 2023-01-25 VITALS
HEART RATE: 61 BPM | TEMPERATURE: 96.7 F | HEIGHT: 68 IN | SYSTOLIC BLOOD PRESSURE: 136 MMHG | DIASTOLIC BLOOD PRESSURE: 84 MMHG | OXYGEN SATURATION: 96 % | WEIGHT: 223.13 LBS | BODY MASS INDEX: 33.82 KG/M2 | RESPIRATION RATE: 16 BRPM

## 2023-01-25 DIAGNOSIS — Z78.0 MENOPAUSE: ICD-10-CM

## 2023-01-25 DIAGNOSIS — D12.6 ADENOMATOUS POLYP OF COLON, UNSPECIFIED PART OF COLON: ICD-10-CM

## 2023-01-25 DIAGNOSIS — Z00.00 ENCOUNTER FOR PREVENTATIVE ADULT HEALTH CARE EXAMINATION: Primary | ICD-10-CM

## 2023-01-25 DIAGNOSIS — L98.9 SKIN LESION: ICD-10-CM

## 2023-01-25 DIAGNOSIS — I10 BENIGN ESSENTIAL HYPERTENSION: ICD-10-CM

## 2023-01-25 DIAGNOSIS — I10 ESSENTIAL HYPERTENSION: ICD-10-CM

## 2023-01-25 LAB
ALBUMIN SERPL BCG-MCNC: 4.1 G/DL (ref 3.5–5.2)
ALP SERPL-CCNC: 71 U/L (ref 35–104)
ALT SERPL W P-5'-P-CCNC: 20 U/L (ref 10–35)
ANION GAP SERPL CALCULATED.3IONS-SCNC: 12 MMOL/L (ref 7–15)
AST SERPL W P-5'-P-CCNC: 24 U/L (ref 10–35)
BILIRUB SERPL-MCNC: 0.3 MG/DL
BUN SERPL-MCNC: 16.7 MG/DL (ref 8–23)
CALCIUM SERPL-MCNC: 9.6 MG/DL (ref 8.8–10.2)
CHLORIDE SERPL-SCNC: 99 MMOL/L (ref 98–107)
CHOLEST SERPL-MCNC: 234 MG/DL
CREAT SERPL-MCNC: 0.83 MG/DL (ref 0.51–0.95)
DEPRECATED HCO3 PLAS-SCNC: 24 MMOL/L (ref 22–29)
ERYTHROCYTE [DISTWIDTH] IN BLOOD BY AUTOMATED COUNT: 11.8 % (ref 10–15)
GFR SERPL CREATININE-BSD FRML MDRD: 79 ML/MIN/1.73M2
GLUCOSE SERPL-MCNC: 95 MG/DL (ref 70–99)
HCT VFR BLD AUTO: 40.5 % (ref 35–47)
HDLC SERPL-MCNC: 69 MG/DL
HGB BLD-MCNC: 13.6 G/DL (ref 11.7–15.7)
LDLC SERPL CALC-MCNC: 146 MG/DL
MCH RBC QN AUTO: 32.7 PG (ref 26.5–33)
MCHC RBC AUTO-ENTMCNC: 33.6 G/DL (ref 31.5–36.5)
MCV RBC AUTO: 97 FL (ref 78–100)
NONHDLC SERPL-MCNC: 165 MG/DL
PLATELET # BLD AUTO: 228 10E3/UL (ref 150–450)
POTASSIUM SERPL-SCNC: 3.9 MMOL/L (ref 3.4–5.3)
PROT SERPL-MCNC: 7.3 G/DL (ref 6.4–8.3)
RBC # BLD AUTO: 4.16 10E6/UL (ref 3.8–5.2)
SODIUM SERPL-SCNC: 135 MMOL/L (ref 136–145)
TRIGL SERPL-MCNC: 95 MG/DL
WBC # BLD AUTO: 6 10E3/UL (ref 4–11)

## 2023-01-25 PROCEDURE — 99396 PREV VISIT EST AGE 40-64: CPT | Performed by: FAMILY MEDICINE

## 2023-01-25 PROCEDURE — 80053 COMPREHEN METABOLIC PANEL: CPT | Performed by: FAMILY MEDICINE

## 2023-01-25 PROCEDURE — 80061 LIPID PANEL: CPT | Performed by: FAMILY MEDICINE

## 2023-01-25 PROCEDURE — 85027 COMPLETE CBC AUTOMATED: CPT | Performed by: FAMILY MEDICINE

## 2023-01-25 PROCEDURE — 36415 COLL VENOUS BLD VENIPUNCTURE: CPT | Performed by: FAMILY MEDICINE

## 2023-01-25 RX ORDER — HYDROCHLOROTHIAZIDE 12.5 MG/1
12.5 TABLET ORAL DAILY
Qty: 90 TABLET | Refills: 3 | Status: SHIPPED | OUTPATIENT
Start: 2023-01-25 | End: 2023-08-01

## 2023-01-25 RX ORDER — BRINZOLAMIDE/BRIMONIDINE TARTRATE 10; 2 MG/ML; MG/ML
1 SUSPENSION/ DROPS OPHTHALMIC EVERY 12 HOURS
COMMUNITY
Start: 2023-01-09

## 2023-01-25 RX ORDER — HYDROCHLOROTHIAZIDE 25 MG/1
25 TABLET ORAL DAILY
Qty: 90 TABLET | Refills: 3 | Status: SHIPPED | OUTPATIENT
Start: 2023-01-25 | End: 2023-08-01

## 2023-01-25 ASSESSMENT — ENCOUNTER SYMPTOMS
BREAST MASS: 0
DYSURIA: 0
COUGH: 0
SORE THROAT: 0
FEVER: 0
ABDOMINAL PAIN: 0
EYE PAIN: 0
FREQUENCY: 0
PARESTHESIAS: 0
DIZZINESS: 0
NERVOUS/ANXIOUS: 0
CONSTIPATION: 0
HEMATOCHEZIA: 0
WEAKNESS: 0
HEADACHES: 0
NAUSEA: 0
HEMATURIA: 0
MYALGIAS: 0
PALPITATIONS: 0
CHILLS: 0
JOINT SWELLING: 0
ARTHRALGIAS: 0
HEARTBURN: 0
SHORTNESS OF BREATH: 0
DIARRHEA: 0

## 2023-01-25 NOTE — PATIENT INSTRUCTIONS
Referral given to dermatology.  Please ask them to look at the skin lesion on the left hand and do a full body skin check.    Continue the 37-1/2 mg of hydrochlorothiazide.  Refills are sent.    Recommend 3 dairy servings a day or calcium with vitamin D twice a day with food.    Have your blood pressure checked a few times a year.  If it is over 140 on top or over 90 on the bottom please let me know.      Health Maintenance   Topic Date Due    ZOSTER IMMUNIZATION (1 of 2) If you are interested in the new shingles shot, Shingrix, please check with insurance for coverage either in clinic or at a pharmacy. It is a 2 shot series 2-6 months apart.     LUNG CANCER SCREENING  Quit 25-30 years    YEARLY PREVENTIVE VISIT  Today    ANNUAL REVIEW OF HM ORDERS  Today    MAMMO SCREENING  Set in March    HPV TEST  Due in 2 years    PAP  Due in 2 years    DTAP/TDAP/TD IMMUNIZATION (2 - Td or Tdap) 02/17/2025    LIPID  Today    ADVANCE CARE PLANNING  We would like a copy please    COLORECTAL CANCER SCREENING  01/09/2028    HEPATITIS C SCREENING  Completed    HIV SCREENING  Completed    PHQ-2 (once per calendar year)  Completed    INFLUENZA VACCINE  Completed    COVID-19 Vaccine  Completed    Pneumococcal Vaccine: Pediatrics (0 to 5 Years) and At-Risk Patients (6 to 64 Years)  Age 65    IPV IMMUNIZATION  Aged Out    MENINGITIS IMMUNIZATION  Aged Out     Dexa scan (Bone density):  Ordered

## 2023-01-25 NOTE — PROGRESS NOTES
SUBJECTIVE:   CC: Zaida is an 62 year old who presents for preventive health visit.   Patient has been advised of split billing requirements and indicates understanding: Yes  Healthy Habits:     Getting at least 3 servings of Calcium per day:  Yes    Bi-annual eye exam:  Yes    Dental care twice a year:  Yes    Sleep apnea or symptoms of sleep apnea:  None    Diet:  Carbohydrate counting    Frequency of exercise:  4-5 days/week    Duration of exercise:  30-45 minutes    Taking medications regularly:  Yes    Medication side effects:  None    PHQ-2 Total Score: 0    Additional concerns today:  No    No new questions or concerns.    Last year referral given to dermatology for skin lesions on her hand and she has not seen them yet but willing to do so now.    Hypertension:  Doing well on hydrochlorothiazide.  Some mouth dryness with sleeping.  No headache, vison changeor chest pain.  Yesterday 120/80.      Health Maintenance   Topic Date Due     ZOSTER IMMUNIZATION (1 of 2) If you are interested in the new shingles shot, Shingrix, please check with insurance for coverage either in clinic or at a pharmacy. It is a 2 shot series 2-6 months apart.      LUNG CANCER SCREENING  Quit 25-30 years     YEARLY PREVENTIVE VISIT  Today     ANNUAL REVIEW OF HM ORDERS  Today     MAMMO SCREENING  Set in March     HPV TEST  Due in 2 years     PAP  Due in 2 years     DTAP/TDAP/TD IMMUNIZATION (2 - Td or Tdap) 02/17/2025     LIPID  Today     ADVANCE CARE PLANNING  We would like a copy please     COLORECTAL CANCER SCREENING  01/09/2028     HEPATITIS C SCREENING  Completed     HIV SCREENING  Completed     PHQ-2 (once per calendar year)  Completed     INFLUENZA VACCINE  Completed     COVID-19 Vaccine  Completed     Pneumococcal Vaccine: Pediatrics (0 to 5 Years) and At-Risk Patients (6 to 64 Years)  Age 65     IPV IMMUNIZATION  Aged Out     MENINGITIS IMMUNIZATION  Aged Out     Dexa scan (Bone density):  Ordered            Today's PHQ-2  Score:   PHQ-2 ( 1999 Pfizer) 1/25/2023   Q1: Little interest or pleasure in doing things 0   Q2: Feeling down, depressed or hopeless 0   PHQ-2 Score 0   Q1: Little interest or pleasure in doing things Not at all   Q2: Feeling down, depressed or hopeless Not at all   PHQ-2 Score 0           Social History     Tobacco Use     Smoking status: Former     Packs/day: 0.50     Years: 7.00     Pack years: 3.50     Types: Cigarettes     Smokeless tobacco: Never   Substance Use Topics     Alcohol use: Yes         Alcohol Use 1/25/2023   Prescreen: >3 drinks/day or >7 drinks/week? No       Reviewed orders with patient.  Reviewed health maintenance and updated orders accordingly - Yes  Lab work is in process    Breast Cancer Screening:    FHS-7:   Breast CA Risk Assessment (FHS-7) 1/5/2022 9/6/2022 9/12/2022 10/31/2022 1/25/2023   Did any of your first-degree relatives have breast or ovarian cancer? Yes Yes Yes Yes Yes   Did any of your relatives have bilateral breast cancer? Unknown No No Unknown No   Did any man in your family have breast cancer? No No No No No   Did any woman in your family have breast and ovarian cancer? Unknown No No Yes Yes   Did any woman in your family have breast cancer before age 50 y? Yes Yes Yes Yes Yes   Do you have 2 or more relatives with breast and/or ovarian cancer? No No No Yes Unknown   Do you have 2 or more relatives with breast and/or bowel cancer? No No No Yes No       Mammogram Screening: Recommended mammography every 1-2 years with patient discussion and risk factor consideration  Pertinent mammograms are reviewed under the imaging tab.    History of abnormal Pap smear: NO - age 30- 65 PAP every 3 years recommended  PAP / HPV Latest Ref Rng & Units 1/5/2022 12/17/2015   PAP   Negative for Intraepithelial Lesion or Malignancy (NILM) Negative for squamous intraepithelial lesion or malignancy  Electronically signed by Shireen Quiroz CT (ASCP) on 12/30/2015 at  3:04 PM     HPV16  "Negative Negative -   HPV18 Negative Negative -   HRHPV Negative Negative -     Reviewed and updated as needed this visit by clinical staff   Tobacco  Allergies  Meds              Reviewed and updated as needed this visit by Provider                     Review of Systems   Constitutional: Negative for chills and fever.   HENT: Negative for congestion, ear pain, hearing loss and sore throat.    Eyes: Negative for pain and visual disturbance.   Respiratory: Negative for cough and shortness of breath.    Cardiovascular: Negative for chest pain, palpitations and peripheral edema.   Gastrointestinal: Negative for abdominal pain, constipation, diarrhea, heartburn, hematochezia and nausea.   Breasts:  Negative for tenderness, breast mass and discharge.   Genitourinary: Negative for dysuria, frequency, genital sores, hematuria, pelvic pain, urgency, vaginal bleeding and vaginal discharge.   Musculoskeletal: Negative for arthralgias, joint swelling and myalgias.   Skin: Negative for rash.   Neurological: Negative for dizziness, weakness, headaches and paresthesias.   Psychiatric/Behavioral: Negative for mood changes. The patient is not nervous/anxious.           OBJECTIVE:   /84 (BP Location: Left arm, Patient Position: Sitting, Cuff Size: Adult Large)   Pulse 61   Temp (!) 96.7  F (35.9  C) (Oral)   Resp 16   Ht 1.727 m (5' 8\")   Wt 101.2 kg (223 lb 2 oz)   SpO2 96%   BMI 33.93 kg/m    Physical Exam  GENERAL: healthy, alert and no distress  EYES: Eyes grossly normal to inspection, PERRL and conjunctivae and sclerae normal  HENT: ear canals and TM's normal, nose and mouth without ulcers or lesions  NECK: no adenopathy, no asymmetry, masses, or scars and thyroid normal to palpation  RESP: lungs clear to auscultation - no rales, rhonchi or wheezes  BREAST: normal without masses, tenderness or nipple discharge and no palpable axillary masses or adenopathy  CV: regular rate and rhythm, normal S1 S2, no S3 or S4, " no murmur, click or rub, no peripheral edema and peripheral pulses strong  ABDOMEN: soft, nontender, no hepatosplenomegaly, no masses and bowel sounds normal   (female): normal female external genitalia, normal urethral meatus, vaginal mucosa pink, moist, well rugated, unable to palpate uterus and ovaries secondary to body habitus  MS: no gross musculoskeletal defects noted, no edema  SKIN: no suspicious lesions or rashes except for some abnormally pigmented macules on the dorsum of the left hand  NEURO: Normal strength and tone, mentation intact and speech normal  PSYCH: mentation appears normal, affect normal/bright    Diagnostic Test Results:  Labs reviewed in Epic    ASSESSMENT/PLAN:       ICD-10-CM    1. Encounter for preventative adult health care examination  Z00.00 Lipid Profile     Lipid Profile      2. Essential hypertension  I10 Comprehensive metabolic panel     CBC with platelets     hydrochlorothiazide (HYDRODIURIL) 12.5 MG tablet     hydrochlorothiazide (HYDRODIURIL) 25 MG tablet     Comprehensive metabolic panel     CBC with platelets      3. Adenomatous polyp of colon, unspecified part of colon  D12.6       4. Benign essential hypertension  I10       5. Menopause  Z78.0 DX Hip/Pelvis/Spine      6. Skin lesion  L98.9 Adult Dermatology Referral        Referral given to dermatology.  Please ask them to look at the skin lesion on the left hand and do a full body skin check.    Continue the 37-1/2 mg of hydrochlorothiazide.  Refills are sent.    Recommend 3 dairy servings a day or calcium with vitamin D twice a day with food.    Have your blood pressure checked a few times a year.  If it is over 140 on top or over 90 on the bottom please let me know.    Patient has been advised of split billing requirements and indicates understanding: Yes      COUNSELING:  Reviewed preventive health counseling, as reflected in patient instructions       Regular exercise       Healthy diet/nutrition      BMI:  "  Estimated body mass index is 33.93 kg/m  as calculated from the following:    Height as of this encounter: 1.727 m (5' 8\").    Weight as of this encounter: 101.2 kg (223 lb 2 oz).   Weight management plan: Patient was referred to their PCP to discuss a diet and exercise plan.      She reports that she has quit smoking. Her smoking use included cigarettes. She has a 3.50 pack-year smoking history. She has never used smokeless tobacco.      Sherita Celestin MD  [facility]  "

## 2023-04-05 ENCOUNTER — ANCILLARY PROCEDURE (OUTPATIENT)
Dept: MAMMOGRAPHY | Facility: CLINIC | Age: 63
End: 2023-04-05
Attending: FAMILY MEDICINE
Payer: COMMERCIAL

## 2023-04-05 DIAGNOSIS — N63.0 BREAST MASS: ICD-10-CM

## 2023-04-05 PROCEDURE — 76642 ULTRASOUND BREAST LIMITED: CPT | Mod: LT

## 2023-04-05 PROCEDURE — 77061 BREAST TOMOSYNTHESIS UNI: CPT | Mod: LT

## 2023-08-01 DIAGNOSIS — I10 ESSENTIAL HYPERTENSION: ICD-10-CM

## 2023-08-01 NOTE — TELEPHONE ENCOUNTER
NEW PHARMACY    Pending Prescriptions:                       Disp   Refills    losartan (COZAAR) 100 MG tablet           90 tab*3            Sig: Take 1 tablet (100 mg) by mouth daily    hydrochlorothiazide (HYDRODIURIL) 12.5 MG*90 tab*3            Sig: Take 1 tablet (12.5 mg) by mouth daily    hydrochlorothiazide (HYDRODIURIL) 25 MG t*90 tab*3            Sig: Take 1 tablet (25 mg) by mouth daily

## 2023-08-02 RX ORDER — HYDROCHLOROTHIAZIDE 12.5 MG/1
12.5 TABLET ORAL DAILY
Qty: 90 TABLET | Refills: 3 | Status: SHIPPED | OUTPATIENT
Start: 2023-08-02 | End: 2024-08-06

## 2023-08-02 RX ORDER — HYDROCHLOROTHIAZIDE 25 MG/1
25 TABLET ORAL DAILY
Qty: 90 TABLET | Refills: 3 | Status: SHIPPED | OUTPATIENT
Start: 2023-08-02 | End: 2024-08-23

## 2023-08-02 RX ORDER — LOSARTAN POTASSIUM 100 MG/1
100 TABLET ORAL DAILY
Qty: 90 TABLET | Refills: 3 | Status: SHIPPED | OUTPATIENT
Start: 2023-08-02 | End: 2024-08-06

## 2023-08-02 NOTE — TELEPHONE ENCOUNTER
"Routing refill request to provider for review/approval because:  A break in medication  Last Written Prescription Date:  1/10/23  Last Fill Quantity: 90,  # refills: 3   Last office visit provider:  1/25/23     Routing refill request to provider for review/approval because:  Labs out of range:  Last  on 1/25/23  Last Written Prescription Date:  1/25/23  Last Fill Quantity: 90,  # refills: 3   Last office visit provider:  1/25/23     Routing refill request to provider for review/approval because:  Labs out of range:  Last  on 1/25/23    Last Written Prescription Date:  1/25/23  Last Fill Quantity: 90,  # refills: 3   Last office visit provider:  1/25/23     Requested Prescriptions   Pending Prescriptions Disp Refills    losartan (COZAAR) 100 MG tablet 90 tablet 3     Sig: Take 1 tablet (100 mg) by mouth daily       Angiotensin-II Receptors Passed - 8/2/2023  1:20 PM        Passed - Last blood pressure under 140/90 in past 12 months     BP Readings from Last 3 Encounters:   01/25/23 136/84   10/31/22 128/82   01/21/22 129/84                 Passed - Recent (12 mo) or future (30 days) visit within the authorizing provider's specialty     Patient has had an office visit with the authorizing provider or a provider within the authorizing providers department within the previous 12 mos or has a future within next 30 days. See \"Patient Info\" tab in inbasket, or \"Choose Columns\" in Meds & Orders section of the refill encounter.              Passed - Medication is active on med list        Passed - Patient is age 18 or older        Passed - No active pregnancy on record        Passed - Normal serum creatinine on file in past 12 months     Recent Labs   Lab Test 01/25/23  1501   CR 0.83       Ok to refill medication if creatinine is low          Passed - Normal serum potassium on file in past 12 months     Recent Labs   Lab Test 01/25/23  1501   POTASSIUM 3.9                    Passed - No positive pregnancy test " "in past 12 months          hydrochlorothiazide (HYDRODIURIL) 12.5 MG tablet 90 tablet 3     Sig: Take 1 tablet (12.5 mg) by mouth daily       Diuretics (Including Combos) Protocol Failed - 8/2/2023  1:20 PM        Failed - Normal serum sodium on file in past 12 months     Recent Labs   Lab Test 01/25/23  1501   *              Passed - Blood pressure under 140/90 in past 12 months     BP Readings from Last 3 Encounters:   01/25/23 136/84   10/31/22 128/82   01/21/22 129/84                 Passed - Recent (12 mo) or future (30 days) visit within the authorizing provider's specialty     Patient has had an office visit with the authorizing provider or a provider within the authorizing providers department within the previous 12 mos or has a future within next 30 days. See \"Patient Info\" tab in inbasket, or \"Choose Columns\" in Meds & Orders section of the refill encounter.              Passed - Medication is active on med list        Passed - Patient is age 18 or older        Passed - No active pregancy on record        Passed - Normal serum creatinine on file in past 12 months     Recent Labs   Lab Test 01/25/23  1501   CR 0.83              Passed - Normal serum potassium on file in past 12 months     Recent Labs   Lab Test 01/25/23  1501   POTASSIUM 3.9                    Passed - No positive pregnancy test in past 12 months          hydrochlorothiazide (HYDRODIURIL) 25 MG tablet 90 tablet 3     Sig: Take 1 tablet (25 mg) by mouth daily       Diuretics (Including Combos) Protocol Failed - 8/2/2023  1:20 PM        Failed - Normal serum sodium on file in past 12 months     Recent Labs   Lab Test 01/25/23  1501   *              Passed - Blood pressure under 140/90 in past 12 months     BP Readings from Last 3 Encounters:   01/25/23 136/84   10/31/22 128/82   01/21/22 129/84                 Passed - Recent (12 mo) or future (30 days) visit within the authorizing provider's specialty     Patient has had an " "office visit with the authorizing provider or a provider within the authorizing providers department within the previous 12 mos or has a future within next 30 days. See \"Patient Info\" tab in inbasket, or \"Choose Columns\" in Meds & Orders section of the refill encounter.              Passed - Medication is active on med list        Passed - Patient is age 18 or older        Passed - No active pregancy on record        Passed - Normal serum creatinine on file in past 12 months     Recent Labs   Lab Test 01/25/23  1501   CR 0.83              Passed - Normal serum potassium on file in past 12 months     Recent Labs   Lab Test 01/25/23  1501   POTASSIUM 3.9                    Passed - No positive pregnancy test in past 12 months             Lacy Humphreys RN 08/02/23 1:20 PM  "

## 2023-10-20 ENCOUNTER — TELEPHONE (OUTPATIENT)
Dept: FAMILY MEDICINE | Facility: CLINIC | Age: 63
End: 2023-10-20
Payer: COMMERCIAL

## 2023-10-20 DIAGNOSIS — Z12.31 VISIT FOR SCREENING MAMMOGRAM: ICD-10-CM

## 2023-10-20 DIAGNOSIS — R92.8 ABNORMAL MAMMOGRAM: Primary | ICD-10-CM

## 2023-10-20 NOTE — TELEPHONE ENCOUNTER
General Call      Reason for Call:  Image scheduling calling wanting orders placed for Left breast US follow up 6 month.   Please place imaging orders.      Date of last appointment with provider: 1-    Could we send this information to you in [employer] or would you prefer to receive a phone call?:   Patient would prefer a phone call   Okay to leave a detailed message?: Yes at Cell number on file:    Telephone Information:   Mobile [phone]

## 2023-10-22 NOTE — TELEPHONE ENCOUNTER
Please call patient.  I placed the order for the left breast ultrasound but it looks like she may be due for bilateral mammogram as well.  She could set up a 3D mammogram also.

## 2023-10-24 NOTE — TELEPHONE ENCOUNTER
Patient returned call. Message relayed.    Patient would like screening mammogram ordered to be done with follow-up US. Declined 3D.

## 2023-10-30 ENCOUNTER — TRANSFERRED RECORDS (OUTPATIENT)
Dept: HEALTH INFORMATION MANAGEMENT | Facility: CLINIC | Age: 63
End: 2023-10-30
Payer: COMMERCIAL

## 2023-10-30 ENCOUNTER — TELEPHONE (OUTPATIENT)
Dept: NURSING | Facility: CLINIC | Age: 63
End: 2023-10-30
Payer: COMMERCIAL

## 2023-10-30 NOTE — TELEPHONE ENCOUNTER
Nurse Triage SBAR    Situation:   -breast imaging    Background:   -Patient calling, It is okay to leave a detailed message at this number.     Assessment:   -unsure what she is needed to schedule r/t breast imaging  -had set up and appointment and then received a call that they could not accommodate it  -warm transferred to breast scheduling, they adjusted the appointment per the orders and notes on 10/22/23    -per notes on 10/22/23:    -currently scheduled:      Recommendation:   Care team: Please verify what is scheduled is what the patient need, have care team call back at [phone]  if adjustment are need,    LOLLY OTOOLE RN on 10/30/2023 at 2:40 PM

## 2023-11-07 ENCOUNTER — ANCILLARY PROCEDURE (OUTPATIENT)
Dept: MAMMOGRAPHY | Facility: CLINIC | Age: 63
End: 2023-11-07
Attending: FAMILY MEDICINE
Payer: COMMERCIAL

## 2023-11-07 DIAGNOSIS — R92.8 ABNORMAL MAMMOGRAM: ICD-10-CM

## 2023-11-07 PROCEDURE — 76642 ULTRASOUND BREAST LIMITED: CPT | Mod: LT

## 2023-11-07 PROCEDURE — 77066 DX MAMMO INCL CAD BI: CPT

## 2023-11-24 ENCOUNTER — OFFICE VISIT (OUTPATIENT)
Dept: FAMILY MEDICINE | Facility: CLINIC | Age: 63
End: 2023-11-24
Payer: COMMERCIAL

## 2023-11-24 VITALS
DIASTOLIC BLOOD PRESSURE: 86 MMHG | TEMPERATURE: 98 F | HEIGHT: 68 IN | HEART RATE: 67 BPM | WEIGHT: 239 LBS | SYSTOLIC BLOOD PRESSURE: 133 MMHG | BODY MASS INDEX: 36.22 KG/M2 | RESPIRATION RATE: 16 BRPM | OXYGEN SATURATION: 98 %

## 2023-11-24 DIAGNOSIS — Z01.818 PREOP GENERAL PHYSICAL EXAM: Primary | ICD-10-CM

## 2023-11-24 DIAGNOSIS — I10 BENIGN ESSENTIAL HYPERTENSION: ICD-10-CM

## 2023-11-24 DIAGNOSIS — H35.373 EPIRETINAL MEMBRANE (ERM) OF BOTH EYES: ICD-10-CM

## 2023-11-24 PROCEDURE — 99214 OFFICE O/P EST MOD 30 MIN: CPT | Performed by: FAMILY MEDICINE

## 2023-11-24 RX ORDER — TRIAMCINOLONE ACETONIDE 1 MG/G
OINTMENT TOPICAL
COMMUNITY
Start: 2023-11-03

## 2023-11-24 NOTE — PROGRESS NOTES
[facility]  [address] [facility] [address]  Phone: [phone]  Fax: [phone]  Primary Provider: Sherita Celestin  Pre-op Performing Provider: REMY ZAMAN      PREOPERATIVE EVALUATION:  Today's date: 11/24/2023    Zaida is a 63 year old, presenting for the following:  Pre-Op Exam        11/24/2023    11:46 AM   Additional Questions   Roomed by Melvi OLGUIN CMA   Accompanied by Self       Surgical Information:  Surgery/Procedure: Left eye surgery  Surgery Location: [facility]  Surgeon: Dr. Thompson  Surgery Date: 12/5/23  Time of Surgery: 1:30 pm  Where patient plans to recover: At home with family  Fax number for surgical facility: [phone], [phone]    Assessment & Plan     The proposed surgical procedure is considered LOW risk.    1. Preop general physical exam  2. Epiretinal membrane (ERM) of both eyes-Right > Left  3. Benign essential hypertension  Zaida is here today for preop, management epiretinal membrane and follow-up procedure for retinal detachment.    Possible Sleep Apnea: declines sleep study at this time.       - No identified additional risk factors other than previously addressed    Antiplatelet or Anticoagulation Medication Instructions:   - Patient is on no antiplatelet or anticoagulation medications.    Additional Medication Instructions:   - ACE/ARB: Continue without modification (e.g., MAC anesthesia, neurosurgery, spine surgery, heart failure, or labile hypertension with risk of hypertension).   - Diuretics: HOLD on the day of surgery.    RECOMMENDATION:  APPROVAL GIVEN to proceed with proposed procedure, without further diagnostic evaluation.        Subjective     HPI related to upcoming procedure:   Scheduled for eye surgery now for the left eye, epiretinal membrane. Right eye previously completed.   Had emergency surgery and repair of torn retina. Installed an oil bubble that now needs to be surgically  removed. Eye has healed sufficiently to proceed with this surgery. Will also try to treat the epiretinal membranes.     Reports that this will be under sedation.          11/24/2023    11:57 AM   Preop Questions   1. Have you ever had a heart attack or stroke? No   2. Have you ever had surgery on your heart or blood vessels, such as a stent placement, a coronary artery bypass, or surgery on an artery in your head, neck, heart, or legs? No   3. Do you have chest pain with activity? No   4. Do you have a history of  heart failure? No   5. Do you currently have a cold, bronchitis or symptoms of other infection? No   6. Do you have a cough, shortness of breath, or wheezing? No   7. Do you or anyone in your family have previous history of blood clots? UNKNOWN - no personal history to her knowledge    8. Do you or does anyone in your family have a serious bleeding problem such as prolonged bleeding following surgeries or cuts? No   9. Have you ever had problems with anemia or been told to take iron pills? No   10. Have you had any abnormal blood loss such as black, tarry or bloody stools, or abnormal vaginal bleeding? No   11. Have you ever had a blood transfusion? No   12. Are you willing to have a blood transfusion if it is medically needed before, during, or after your surgery? Yes   13. Have you or any of your relatives ever had problems with anesthesia? No   14. Do you have sleep apnea, excessive snoring or daytime drowsiness? YES - Has not been diagnosed,  notes she snores    14a. Do you have a CPAP machine? No   15. Do you have any artifical heart valves or other implanted medical devices like a pacemaker, defibrillator, or continuous glucose monitor? No   16. Do you have artificial joints? No   17. Are you allergic to latex? No       Health Care Directive:  Patient does not have a Health Care Directive or Living Will: Patient states has Advance Directive and will bring in a copy to clinic.      Preoperative  Review of :   reviewed - no record of controlled substances prescribed.      Status of Chronic Conditions:  See problem list for active medical problems.  Problems all longstanding and stable, except as noted/documented.  See ROS for pertinent symptoms related to these conditions.      Review of Systems  Constitutional, neuro, ENT, endocrine, pulmonary, cardiac, gastrointestinal, genitourinary, musculoskeletal, integument and psychiatric systems are negative, except as otherwise noted.    Patient Active Problem List    Diagnosis Date Noted    Epiretinal membrane (ERM) of both eyes-Right > Left 10/31/2022     Priority: Medium    Endometriosis 10/31/2022     Priority: Medium    Benign essential hypertension 10/31/2022     Priority: Medium    Adenomatous colon polyp 01/05/2022     Priority: Medium    Obesity      Priority: Medium     Created by Conversion          No past medical history on file.  Past Surgical History:   Procedure Laterality Date    CATARACT EXTRACTION Right     RIght 2015, Left Nov. 2022    DILATION AND CURETTAGE      EYE SURGERY Bilateral     Laser surgery 2015 and 2017 for bilateral retinal tears    OTHER SURGICAL HISTORY  1992    laporoscopy for infertiltiy     Current Outpatient Medications   Medication Sig Dispense Refill    hydrochlorothiazide (HYDRODIURIL) 12.5 MG tablet Take 1 tablet (12.5 mg) by mouth daily 90 tablet 3    hydrochlorothiazide (HYDRODIURIL) 25 MG tablet Take 1 tablet (25 mg) by mouth daily 90 tablet 3    losartan (COZAAR) 100 MG tablet Take 1 tablet (100 mg) by mouth daily 90 tablet 3    brinzolamide-brimonidine (SIMBRINZA) 1-0.2 % ophthalmic suspension Apply 1 drop to eye every 12 hours (Patient not taking: Reported on 11/24/2023)      KETOROLAC TROMETHAMINE OP  (Patient not taking: Reported on 11/24/2023)      triamcinolone (KENALOG) 0.1 % external ointment  (Patient not taking: Reported on 11/24/2023)         Allergies   Allergen Reactions    Aspirin Anaphylaxis     "Ibuprofen Swelling    Lisinopril Cough        Social History     Tobacco Use    Smoking status: Former     Packs/day: 0.50     Years: 7.00     Additional pack years: 0.00     Total pack years: 3.50     Types: Cigarettes    Smokeless tobacco: Never   Substance Use Topics    Alcohol use: Yes     Family History   Problem Relation Age of Onset    Breast Cancer Mother 45    No Known Problems Father         fell and fractured skull    No Known Problems Brother     Arthritis Maternal Grandmother     Cerebrovascular Disease Paternal Grandfather     Breast Cancer Maternal Aunt 53         of COPD    Chronic Obstructive Pulmonary Disease Maternal Aunt     Coronary Artery Disease Paternal Aunt          of MI     History   Drug Use No         Objective     /86 (BP Location: Left arm, Patient Position: Sitting, Cuff Size: Adult Large)   Pulse 67   Temp 98  F (36.7  C) (Oral)   Resp 16   Ht 1.727 m (5' 8\")   Wt 108.4 kg (239 lb)   SpO2 98%   BMI 36.34 kg/m      Physical Exam    GENERAL APPEARANCE: healthy, alert and no distress     EYES: EOMI, PERRL     HENT: ear canals and TM's normal and nose and mouth without ulcers or lesions     NECK: no adenopathy, no asymmetry, masses, or scars and thyroid normal to palpation     RESP: lungs clear to auscultation - no rales, rhonchi or wheezes     CV: regular rates and rhythm, normal S1 S2, no S3 or S4 and no murmur, click or rub     ABDOMEN:  soft, nontender, no HSM or masses and bowel sounds normal     MS: extremities normal- no gross deformities noted, no evidence of inflammation in joints, FROM in all extremities.     SKIN: no suspicious lesions or rashes     NEURO: Normal strength and tone, sensory exam grossly normal, mentation intact and speech normal     PSYCH: mentation appears normal. and affect normal/bright     LYMPHATICS: No cervical adenopathy      Recent Labs   Lab Test 23  1501 22  1557   HGB 13.6 13.6    243   * 136   POTASSIUM " 3.9 3.9   CR 0.83 0.69        Diagnostics:  No labs were ordered during this visit.     No EKG required for low risk surgery (cataract, skin procedure, breast biopsy, etc).    Revised Cardiac Risk Index (RCRI):  The patient has the following serious cardiovascular risks for perioperative complications:   - No serious cardiac risks = 0 points     RCRI Interpretation: 0 points: Class I (very low risk - 0.4% complication rate)         Signed Electronically by: Maritza Ackerman DO  Copy of this evaluation report is provided to requesting physician.

## 2023-11-24 NOTE — PROGRESS NOTES
[facility]  [address] [facility] [address]  Phone: [phone]  Fax: [phone]  Primary Provider: Sherita Celestin  Pre-op Performing Provider: REMY ZAMAN    {Provider  Link to PREOP SmartSet  Use this to apply standard patient instructions to AVS; includes medication directions, common orders, guidelines for anemia, warfarin, additional testing   :288479}  PREOPERATIVE EVALUATION:  Today's date: 11/24/2023    Zaida is a 63 year old, presenting for the following:  Pre-Op Exam        11/24/2023    11:46 AM   Additional Questions   Roomed by Melvi OLGUIN CMA   Accompanied by Self       Surgical Information:  Surgery/Procedure: Left eye surgery  Surgery Location: [facility]  Surgeon: Dr. Thompson  Surgery Date: 12/5/23  Time of Surgery: 1:30 pm  Where patient plans to recover: At home with family  Fax number for surgical facility: ***    {2021 Provider Charting Preference for Preop :097216}    Subjective       HPI related to upcoming procedure: ***    {Click here to pull in Questionnaire Data after Qnr completed :629917}  Health Care Directive:  Patient does not have a Health Care Directive or Living Will: {ADVANCE_DIRECTIVE_STATUS:056107}    Preoperative Review of :  {Mnpmpreport:264731}  {Review MNPMP for all patients per [employer] MNPMP Profile:154744}    {Chronic problem details (Optional) :078895}    Review of Systems  {ROS Preop Choices:196274}    Patient Active Problem List    Diagnosis Date Noted    Epiretinal membrane (ERM) of both eyes-Right > Left 10/31/2022     Priority: Medium    Endometriosis 10/31/2022     Priority: Medium    Benign essential hypertension 10/31/2022     Priority: Medium    Adenomatous colon polyp 01/05/2022     Priority: Medium    Obesity      Priority: Medium     Created by Conversion          No past medical history on file.  Past Surgical History:   Procedure Laterality Date    CATARACT EXTRACTION Right      "RIght , Left 2022    DILATION AND CURETTAGE      EYE SURGERY Bilateral     Laser surgery  and  for bilateral retinal tears    OTHER SURGICAL HISTORY      laporoscopy for infertiltiy     Current Outpatient Medications   Medication Sig Dispense Refill    hydrochlorothiazide (HYDRODIURIL) 12.5 MG tablet Take 1 tablet (12.5 mg) by mouth daily 90 tablet 3    hydrochlorothiazide (HYDRODIURIL) 25 MG tablet Take 1 tablet (25 mg) by mouth daily 90 tablet 3    losartan (COZAAR) 100 MG tablet Take 1 tablet (100 mg) by mouth daily 90 tablet 3    brinzolamide-brimonidine (SIMBRINZA) 1-0.2 % ophthalmic suspension Apply 1 drop to eye every 12 hours (Patient not taking: Reported on 2023)      KETOROLAC TROMETHAMINE OP  (Patient not taking: Reported on 2023)      triamcinolone (KENALOG) 0.1 % external ointment  (Patient not taking: Reported on 2023)         Allergies   Allergen Reactions    Aspirin Anaphylaxis    Ibuprofen Swelling    Lisinopril Cough        Social History     Tobacco Use    Smoking status: Former     Packs/day: 0.50     Years: 7.00     Additional pack years: 0.00     Total pack years: 3.50     Types: Cigarettes    Smokeless tobacco: Never   Substance Use Topics    Alcohol use: Yes     {FAMILY HISTORY (Optional):924427628}  History   Drug Use No         Objective     /86 (BP Location: Left arm, Patient Position: Sitting, Cuff Size: Adult Large)   Pulse 67   Temp 98  F (36.7  C) (Oral)   Resp 16   Ht 1.727 m (5' 8\")   Wt 108.4 kg (239 lb)   SpO2 98%   BMI 36.34 kg/m      Physical Exam  {EXAM Preop Choices:627950}    Recent Labs   Lab Test 23  1501 22  1557   HGB 13.6 13.6    243   * 136   POTASSIUM 3.9 3.9   CR 0.83 0.69        Diagnostics:  {LABS:079645}   {EK}    Revised Cardiac Risk Index (RCRI):  The patient has the following serious cardiovascular risks for perioperative complications:  {PREOP REVISED CARDIAC RISK INDEX (RCRI) " :201335}     RCRI Interpretation: {REVISED CARDIAC RISK INTERPRETATION :008161}         Signed Electronically by: Maritza Ackerman DO  Copy of this evaluation report is provided to requesting physician.    {Provider Resources  Preop [facility] Guidelines  Revised Cardiac Risk Index :380074}

## 2023-11-24 NOTE — PATIENT INSTRUCTIONS
Preparing for Your Surgery  Getting started  A nurse will call you to review your health history and instructions. They will give you an arrival time based on your scheduled surgery time. Please be ready to share:  Your doctor's clinic name and phone number  Your medical, surgical, and anesthesia history  A list of allergies and sensitivities  A list of medicines, including herbal treatments and over-the-counter drugs  Whether the patient has a legal guardian (ask how to send us the papers in advance)  Please tell us if you're pregnant--or if there's any chance you might be pregnant. Some surgeries may injure a fetus (unborn baby), so they require a pregnancy test. Surgeries that are safe for a fetus don't always need a test, and you can choose whether to have one.   If you have a child who's having surgery, please ask for a copy of Preparing for Your Child's Surgery.    Preparing for surgery  Within 10 to 30 days of surgery: Have a pre-op exam (sometimes called an H&P, or History and Physical). This can be done at a clinic or pre-operative center.  If you're having a , you may not need this exam. Talk to your care team.  At your pre-op exam, talk to your care team about all medicines you take. If you need to stop any medicines before surgery, ask when to start taking them again.  We do this for your safety. Many medicines can make you bleed too much during surgery. Some change how well surgery (anesthesia) drugs work.  Call your insurance company to let them know you're having surgery. (If you don't have insurance, call [phone].)  Call your clinic if there's any change in your health. This includes signs of a cold or flu (sore throat, runny nose, cough, rash, fever). It also includes a scrape or scratch near the surgery site.  If you have questions on the day of surgery, call your hospital or surgery center.  Eating and drinking guidelines  For your safety: Unless your surgeon tells you otherwise,  follow the guidelines below.  Eat and drink as usual until 8 hours before you arrive for surgery. After that, no food or milk.  Drink clear liquids until 2 hours before you arrive. These are liquids you can see through, like water, Gatorade, and Propel Water. They also include plain black coffee and tea (no cream or milk), candy, and breath mints. You can spit out gum when you arrive.  If you drink alcohol: Stop drinking it the night before surgery.  If your care team tells you to take medicine on the morning of surgery, it's okay to take it with a sip of water.  Preventing infection  Shower or bathe the night before and morning of your surgery. Follow the instructions your clinic gave you. (If no instructions, use regular soap.)  Don't shave or clip hair near your surgery site. We'll remove the hair if needed.  Don't smoke or vape the morning of surgery. You may chew nicotine gum up to 2 hours before surgery. A nicotine patch is okay.  Note: Some surgeries require you to completely quit smoking and nicotine. Check with your surgeon.  Your care team will make every effort to keep you safe from infection. We will:  Clean our hands often with soap and water (or an alcohol-based hand rub).  Clean the skin at your surgery site with a special soap that kills germs.  Give you a special gown to keep you warm. (Cold raises the risk of infection.)  Wear special hair covers, masks, gowns and gloves during surgery.  Give antibiotic medicine, if prescribed. Not all surgeries need antibiotics.  What to bring on the day of surgery  Photo ID and insurance card  Copy of your health care directive, if you have one  Glasses and hearing aids (bring cases)  You can't wear contacts during surgery  Inhaler and eye drops, if you use them (tell us about these when you arrive)  CPAP machine or breathing device, if you use them  A few personal items, if spending the night  If you have . . .  A pacemaker, ICD (cardiac defibrillator) or other  implant: Bring the ID card.  An implanted stimulator: Bring the remote control.  A legal guardian: Bring a copy of the certified (court-stamped) guardianship papers.  Please remove any jewelry, including body piercings. Leave jewelry and other valuables at home.  If you're going home the day of surgery  You must have a responsible adult drive you home. They should stay with you overnight as well.  If you don't have someone to stay with you, and you aren't safe to go home alone, we may keep you overnight. Insurance often won't pay for this.  After surgery  If it's hard to control your pain or you need more pain medicine, please call your surgeon's office.  Questions?   If you have any questions for your care team, list them here: _________________________________________________________________________________________________________________________________________________________________________ ____________________________________ ____________________________________ ____________________________________  For informational purposes only. Not to replace the advice of your health care provider. Copyright   2003, 2019 [employer]. All rights reserved. Clinically reviewed by Valentina Conti MD. [employer] [id number] - REV 12/22.    How to Take Your Medication Before Surgery  - Take all of your medications before surgery except as noted below  - HOLD (do not take) your HYDROCHLOROTHIAZIDE on the morning of surgery.

## 2024-01-16 ENCOUNTER — PATIENT OUTREACH (OUTPATIENT)
Dept: GASTROENTEROLOGY | Facility: CLINIC | Age: 64
End: 2024-01-16
Payer: COMMERCIAL

## 2024-03-03 ENCOUNTER — HEALTH MAINTENANCE LETTER (OUTPATIENT)
Age: 64
End: 2024-03-03

## 2024-04-15 ENCOUNTER — TRANSFERRED RECORDS (OUTPATIENT)
Dept: HEALTH INFORMATION MANAGEMENT | Facility: CLINIC | Age: 64
End: 2024-04-15
Payer: COMMERCIAL

## 2024-08-05 DIAGNOSIS — I10 ESSENTIAL HYPERTENSION: ICD-10-CM

## 2024-08-06 RX ORDER — HYDROCHLOROTHIAZIDE 12.5 MG/1
12.5 TABLET ORAL DAILY
Qty: 90 TABLET | Refills: 0 | Status: SHIPPED | OUTPATIENT
Start: 2024-08-06

## 2024-08-06 RX ORDER — LOSARTAN POTASSIUM 100 MG/1
100 TABLET ORAL DAILY
Qty: 90 TABLET | Refills: 0 | Status: SHIPPED | OUTPATIENT
Start: 2024-08-06

## 2024-08-06 NOTE — TELEPHONE ENCOUNTER
Essential hypertension  - hydroCHLOROthiazide 12.5 MG tablet; TAKE 1 TABLET DAILY  Dispense: 90 tablet; Refill: 0  - losartan (COZAAR) 100 MG tablet; TAKE 1 TABLET DAILY  Dispense: 90 tablet; Refill: 0     BP Readings from Last 6 Encounters:   11/24/23 133/86   01/25/23 136/84   10/31/22 128/82   01/21/22 129/84   01/05/22 (!) 145/89   06/04/21 (!) 136/90     Last Comprehensive Metabolic Panel:  Lab Results   Component Value Date     (L) 01/25/2023    POTASSIUM 3.9 01/25/2023    CHLORIDE 99 01/25/2023    CO2 24 01/25/2023    ANIONGAP 12 01/25/2023    GLC 95 01/25/2023    BUN 16.7 01/25/2023    CR 0.83 01/25/2023    GFRESTIMATED 79 01/25/2023    PAVAN 9.6 01/25/2023       Refills sent to pharmacy. Overdue for BMP. Evaluate at upcoming appointment.    Maritza Ackerman, DO

## 2024-08-23 ENCOUNTER — TELEPHONE (OUTPATIENT)
Dept: FAMILY MEDICINE | Facility: CLINIC | Age: 64
End: 2024-08-23
Payer: COMMERCIAL

## 2024-08-23 DIAGNOSIS — I10 ESSENTIAL HYPERTENSION: ICD-10-CM

## 2024-08-23 RX ORDER — HYDROCHLOROTHIAZIDE 25 MG/1
25 TABLET ORAL DAILY
Qty: 90 TABLET | Refills: 0 | Status: SHIPPED | OUTPATIENT
Start: 2024-08-23

## 2024-08-23 NOTE — TELEPHONE ENCOUNTER
Attempted to call patient, left message for return call to clinic. Please clarify current hydrochlorothiazide dose when she returns call. Thanks!    Cira Terry RN

## 2024-08-23 NOTE — TELEPHONE ENCOUNTER
Therapeutic duplication. [facility] from [facility] states it appears patient is either taking both medication or going back and forth between the two. Please clarify.     hydrochlorothiazide (HYDRODIURIL) 25 MG tablet 90 tablet 0 8/23/2024 -- No   Sig - Route: TAKE 1 TABLET DAILY - Oral   Sent to pharmacy as: hydroCHLOROthiazide 25 MG Oral Tablet (HYDRODIURIL)   Class: E-Prescribe   Order: [id number]   E-Prescribing Status: Receipt confirmed by pharmacy (8/23/2024 10:31 AM CDT)     OR    hydroCHLOROthiazide 12.5 MG tablet 90 tablet 0 8/6/2024 -- No   Sig - Route: TAKE 1 TABLET DAILY - Oral   Sent to pharmacy as: hydroCHLOROthiazide 12.5 MG Oral Tablet   Class: E-Prescribe   Order: [id number]   E-Prescribing Status: Receipt confirmed by pharmacy (8/6/2024  8:22 AM CDT)

## 2024-08-26 NOTE — TELEPHONE ENCOUNTER
Patient called back stating that she's taking 37.5mg of hydrochlorothiazide. Since she couldn't get sooner appt. with Dr. Ackerman, [facility] the 12.5[facility] and she needs the 25mg to be send to [facility].

## 2024-08-30 ENCOUNTER — TRANSFERRED RECORDS (OUTPATIENT)
Dept: HEALTH INFORMATION MANAGEMENT | Facility: CLINIC | Age: 64
End: 2024-08-30
Payer: COMMERCIAL

## 2024-09-30 ENCOUNTER — TRANSFERRED RECORDS (OUTPATIENT)
Dept: HEALTH INFORMATION MANAGEMENT | Facility: CLINIC | Age: 64
End: 2024-09-30
Payer: COMMERCIAL

## 2024-10-20 NOTE — TELEPHONE ENCOUNTER
Attempted to call patient, left message for return call to clinic. Please see and relay provider notation below regarding follow up and routine breast imaging below when she returns call.    Cira Terry RN     Dermatology following annually.  Next appointment on 12/4/2024

## 2024-10-29 ENCOUNTER — OFFICE VISIT (OUTPATIENT)
Dept: FAMILY MEDICINE | Facility: CLINIC | Age: 64
End: 2024-10-29
Payer: COMMERCIAL

## 2024-10-29 VITALS
SYSTOLIC BLOOD PRESSURE: 143 MMHG | BODY MASS INDEX: 37.89 KG/M2 | HEIGHT: 68 IN | HEART RATE: 61 BPM | WEIGHT: 250 LBS | DIASTOLIC BLOOD PRESSURE: 95 MMHG | RESPIRATION RATE: 16 BRPM | OXYGEN SATURATION: 95 % | TEMPERATURE: 98.1 F

## 2024-10-29 DIAGNOSIS — E66.812 CLASS 2 SEVERE OBESITY DUE TO EXCESS CALORIES WITH SERIOUS COMORBIDITY AND BODY MASS INDEX (BMI) OF 38.0 TO 38.9 IN ADULT (H): ICD-10-CM

## 2024-10-29 DIAGNOSIS — Z23 IMMUNIZATION DUE: ICD-10-CM

## 2024-10-29 DIAGNOSIS — Z13.220 LIPID SCREENING: ICD-10-CM

## 2024-10-29 DIAGNOSIS — I10 BENIGN ESSENTIAL HYPERTENSION: ICD-10-CM

## 2024-10-29 DIAGNOSIS — E66.01 CLASS 2 SEVERE OBESITY DUE TO EXCESS CALORIES WITH SERIOUS COMORBIDITY AND BODY MASS INDEX (BMI) OF 38.0 TO 38.9 IN ADULT (H): ICD-10-CM

## 2024-10-29 DIAGNOSIS — Z00.00 ANNUAL PHYSICAL EXAM: Primary | ICD-10-CM

## 2024-10-29 DIAGNOSIS — Z12.31 ENCOUNTER FOR SCREENING MAMMOGRAM FOR BREAST CANCER: ICD-10-CM

## 2024-10-29 PROBLEM — Z86.0100 HISTORY OF COLONIC POLYPS: Status: ACTIVE | Noted: 2023-01-11

## 2024-10-29 PROBLEM — D12.6 ADENOMATOUS COLON POLYP: Status: RESOLVED | Noted: 2022-01-05 | Resolved: 2024-10-29

## 2024-10-29 PROBLEM — D12.2 BENIGN NEOPLASM OF ASCENDING COLON: Status: RESOLVED | Noted: 2019-11-12 | Resolved: 2024-10-29

## 2024-10-29 PROBLEM — K64.9 HEMORRHOIDS: Status: ACTIVE | Noted: 2019-11-08

## 2024-10-29 PROBLEM — D12.0 BENIGN NEOPLASM OF CECUM: Status: ACTIVE | Noted: 2019-11-12

## 2024-10-29 PROBLEM — D12.5 BENIGN NEOPLASM OF SIGMOID COLON: Status: ACTIVE | Noted: 2023-01-11

## 2024-10-29 PROBLEM — D12.0 BENIGN NEOPLASM OF CECUM: Status: RESOLVED | Noted: 2019-11-12 | Resolved: 2024-10-29

## 2024-10-29 PROBLEM — K57.30 DIVERTICULAR DISEASE OF LARGE INTESTINE: Status: ACTIVE | Noted: 2019-11-08

## 2024-10-29 PROBLEM — D12.3 BENIGN NEOPLASM OF TRANSVERSE COLON: Status: ACTIVE | Noted: 2019-11-12

## 2024-10-29 PROBLEM — D12.5 BENIGN NEOPLASM OF SIGMOID COLON: Status: RESOLVED | Noted: 2023-01-11 | Resolved: 2024-10-29

## 2024-10-29 PROBLEM — D12.3 BENIGN NEOPLASM OF TRANSVERSE COLON: Status: RESOLVED | Noted: 2019-11-12 | Resolved: 2024-10-29

## 2024-10-29 PROBLEM — D12.2 BENIGN NEOPLASM OF ASCENDING COLON: Status: ACTIVE | Noted: 2019-11-12

## 2024-10-29 PROCEDURE — 80048 BASIC METABOLIC PNL TOTAL CA: CPT | Performed by: FAMILY MEDICINE

## 2024-10-29 PROCEDURE — 80061 LIPID PANEL: CPT | Performed by: FAMILY MEDICINE

## 2024-10-29 PROCEDURE — 99214 OFFICE O/P EST MOD 30 MIN: CPT | Mod: 25 | Performed by: FAMILY MEDICINE

## 2024-10-29 PROCEDURE — 90750 HZV VACC RECOMBINANT IM: CPT | Performed by: FAMILY MEDICINE

## 2024-10-29 PROCEDURE — 90471 IMMUNIZATION ADMIN: CPT | Performed by: FAMILY MEDICINE

## 2024-10-29 PROCEDURE — 36415 COLL VENOUS BLD VENIPUNCTURE: CPT | Performed by: FAMILY MEDICINE

## 2024-10-29 PROCEDURE — 99396 PREV VISIT EST AGE 40-64: CPT | Mod: 25 | Performed by: FAMILY MEDICINE

## 2024-10-29 RX ORDER — HYDROCHLOROTHIAZIDE 25 MG/1
25 TABLET ORAL DAILY
Qty: 90 TABLET | Refills: 3 | Status: SHIPPED | OUTPATIENT
Start: 2024-10-29

## 2024-10-29 RX ORDER — HYDROCHLOROTHIAZIDE 12.5 MG/1
12.5 TABLET ORAL DAILY
Qty: 90 TABLET | Refills: 3 | Status: SHIPPED | OUTPATIENT
Start: 2024-10-29

## 2024-10-29 RX ORDER — LOSARTAN POTASSIUM 100 MG/1
100 TABLET ORAL DAILY
Qty: 90 TABLET | Refills: 3 | Status: SHIPPED | OUTPATIENT
Start: 2024-10-29

## 2024-10-29 RX ORDER — TIMOLOL MALEATE 5 MG/ML
1 SOLUTION/ DROPS OPHTHALMIC 2 TIMES DAILY
COMMUNITY
Start: 2024-10-07

## 2024-10-29 SDOH — HEALTH STABILITY: PHYSICAL HEALTH
ON AVERAGE, HOW MANY DAYS PER WEEK DO YOU ENGAGE IN MODERATE TO STRENUOUS EXERCISE (LIKE A BRISK WALK)?: PATIENT DECLINED

## 2024-10-29 SDOH — HEALTH STABILITY: PHYSICAL HEALTH: ON AVERAGE, HOW MANY MINUTES DO YOU ENGAGE IN EXERCISE AT THIS LEVEL?: PATIENT DECLINED

## 2024-10-29 ASSESSMENT — SOCIAL DETERMINANTS OF HEALTH (SDOH): HOW OFTEN DO YOU GET TOGETHER WITH FRIENDS OR RELATIVES?: ONCE A WEEK

## 2024-10-29 NOTE — PATIENT INSTRUCTIONS
Patient Education   Preventive Care Advice   This is general advice given by our system to help you stay healthy. However, your care team may have specific advice just for you. Please talk to your care team about your preventive care needs.  Nutrition  Eat 5 or more servings of fruits and vegetables each day.  Try wheat bread, brown rice and whole grain pasta (instead of white bread, rice, and pasta).  Get enough calcium and vitamin D. Check the label on foods and aim for 100% of the RDA (recommended daily allowance).  Lifestyle  Exercise at least 150 minutes each week  (30 minutes a day, 5 days a week).  Do muscle strengthening activities 2 days a week. These help control your weight and prevent disease.  No smoking.  Wear sunscreen to prevent skin cancer.  Have a dental exam and cleaning every 6 months.  Yearly exams  See your health care team every year to talk about:  Any changes in your health.  Any medicines your care team has prescribed.  Preventive care, family planning, and ways to prevent chronic diseases.  Shots (vaccines)   HPV shots (up to age 26), if you've never had them before.  Hepatitis B shots (up to age 59), if you've never had them before.  COVID-19 shot: Get this shot when it's due.  Flu shot: Get a flu shot every year.  Tetanus shot: Get a tetanus shot every 10 years.  Pneumococcal, hepatitis A, and RSV shots: Ask your care team if you need these based on your risk.  Shingles shot (for age 50 and up)  General health tests  Diabetes screening:  Starting at age 35, Get screened for diabetes at least every 3 years.  If you are younger than age 35, ask your care team if you should be screened for diabetes.  Cholesterol test: At age 39, start having a cholesterol test every 5 years, or more often if advised.  Bone density scan (DEXA): At age 50, ask your care team if you should have this scan for osteoporosis (brittle bones).  Hepatitis C: Get tested at least once in your life.  STIs (sexually  transmitted infections)  Before age 24: Ask your care team if you should be screened for STIs.  After age 24: Get screened for STIs if you're at risk. You are at risk for STIs (including HIV) if:  You are sexually active with more than one person.  You don't use condoms every time.  You or a partner was diagnosed with a sexually transmitted infection.  If you are at risk for HIV, ask about PrEP medicine to prevent HIV.  Get tested for HIV at least once in your life, whether you are at risk for HIV or not.  Cancer screening tests  Cervical cancer screening: If you have a cervix, begin getting regular cervical cancer screening tests starting at age 21.  Breast cancer scan (mammogram): If you've ever had breasts, begin having regular mammograms starting at age 40. This is a scan to check for breast cancer.  Colon cancer screening: It is important to start screening for colon cancer at age 45.  Have a colonoscopy test every 10 years (or more often if you're at risk) Or, ask your provider about stool tests like a FIT test every year or Cologuard test every 3 years.  To learn more about your testing options, visit:   .  For help making a decision, visit:   https://bit.ly/nt72395.  Prostate cancer screening test: If you have a prostate, ask your care team if a prostate cancer screening test (PSA) at age 55 is right for you.  Lung cancer screening: If you are a current or former smoker ages 50 to 80, ask your care team if ongoing lung cancer screenings are right for you.  For informational purposes only. Not to replace the advice of your health care provider. Copyright   2023 [facility]. All rights reserved. Clinically reviewed by the [facility]. [employer] [id number] - REV 01/24.  9 Ways to Cut Back on Drinking  Maybe you've found yourself drinking more alcohol than you'd prefer. If you want to cut back, here are some ideas to try.    Think before you drink.  Do you really want a drink,  "or is it just a habit? If you're used to having a drink at a certain time, try doing something else then.     Look for substitutes.  Find some no-alcohol drinks that you enjoy, like flavored seltzer water, tea with honey, or tonic with a slice of lime. Or try alcohol-free beer or \"virgin\" cocktails (without the alcohol).     Drink more water.  Use water to quench your thirst. Drink a glass of water before you have any alcohol. Have another glass along with every drink or between drinks.     Shrink your drink.  For example, have a bottle of beer instead of a pint. Use a smaller glass for wine. Choose drinks with lower alcohol content (ABV%). Or use less liquor and more mixer in cocktails.     Slow down.  It's easy to drink quickly and without thinking about it. Pay attention, and make each drink last longer.     Do the math.  Total up how much you spend on alcohol each month. How much is that a year? If you cut back, what could you do with the money you save?     Take a break.  Choose a day or two each week when you won't drink at all. Notice how you feel on those days, physically and emotionally. How did you sleep? Do you feel better? Over time, add more break days.     Count calories.  Would you like to lose some weight? For some people that's a good motivator for cutting back. Figure out how many calories are in each drink. How many does that add up to in a day? In a week? In a month?     Practice saying no.  Be ready when someone offers you a drink. Try: \"Thanks, I've had enough.\" Or \"Thanks, but I'm cutting back.\" Or \"No, thanks. I feel better when I drink less.\"   Current as of: November 15, 2023  Content Version: 14.2 2024 [employer]   Care instructions adapted under license by your healthcare professional. If you have questions about a medical condition or this instruction, always ask your healthcare professional. [employer] disclaims any warranty or liability for your use of " this information.

## 2024-10-29 NOTE — PROGRESS NOTES
"Preventive Care Visit  [facility]  Maritza Ackerman DO, Family Medicine  Oct 29, 2024      Assessment & Plan     1. Annual physical exam (Primary)  Reviewed health history and health maintenance recommendations.    2. Class 2 severe obesity due to excess calories with serious comorbidity and body mass index (BMI) of 38.0 to 38.9 in adult (H)  Continuing good nutrition, exercise has been more challenging in the last year with various musculoskeletal complaints.  Plans to keep working on healthy habits.    3. Benign essential hypertension  - BASIC METABOLIC PANEL; Future  - losartan (COZAAR) 100 MG tablet; Take 1 tablet (100 mg) by mouth daily.  Dispense: 90 tablet; Refill: 3  - hydrochlorothiazide (HYDRODIURIL) 25 MG tablet; Take 1 tablet (25 mg) by mouth daily.  Dispense: 90 tablet; Refill: 3  - hydroCHLOROthiazide 12.5 MG tablet; Take 1 tablet (12.5 mg) by mouth daily.  Dispense: 90 tablet; Refill: 3    Update medication monitoring labs, blood pressure is still a little elevated.  We will send message to patient in 2 weeks by [employer] for update on home readings.  If persistently elevated, would recommend addition of calcium channel blocker.    4. Encounter for screening mammogram for breast cancer  - MA Screen Bilateral w/Christiano; Future    5. Lipid screening  - Lipid panel reflex to direct LDL Non-fasting; Future    6. Immunization due  - ZOSTER RECOMBINANT ADJUVANTED (SHINGRIX); Standing        Patient has been advised of split billing requirements and indicates understanding: Yes        BMI  Estimated body mass index is 38.01 kg/m  as calculated from the following:    Height as of this encounter: 1.727 m (5' 8\").    Weight as of this encounter: 113.4 kg (250 lb).   Weight management plan: Discussed healthy diet and exercise guidelines    Counseling  Appropriate preventive services were addressed with this patient via screening, questionnaire, or discussion as appropriate for fall " prevention, nutrition, physical activity, Tobacco-use cessation, social engagement, weight loss and cognition.  Checklist reviewing preventive services available has been given to the patient.  Reviewed patient's diet, addressing concerns and/or questions.   She is at risk for psychosocial distress and has been provided with information to reduce risk.   The patient reports drinking more than 3 alcoholic drinks per day and/or more than 7 drhnks per week. The patient was counseled and given information about possible harmful effects of excessive alcohol intake.      Yenny Cerda is a 64 year old, presenting for the following:  Physical        10/29/2024    12:08 PM   Additional Questions   Roomed by Sree ESPINOZA    Annual physical exam (Primary)  - colon: January 2023, repeat 5 years (2028)   - pap: normal January 2022, repeat in 3 years? (due January 2025)  - mammo: diagnostic mammo November 2023      - shingles: covered in clinic     Saw Tareen a year ago. Will see derm.  Sees eye doctor and specialist.  Sees dentist periodically.   Hearing okay.     The 10-year ASCVD risk score (Melquiades DK, et al., 2019) is: 8.2%    Values used to calculate the score:      Age: 64 years      Sex: Female      Is Non- : No      Diabetic: No      Tobacco smoker: No      Systolic Blood Pressure: 143 mmHg      Is BP treated: Yes      HDL Cholesterol: 69 mg/dL      Total Cholesterol: 234 mg/dL     Class 2 severe obesity due to excess calories with serious comorbidity and body mass index (BMI) of 38.0 to 38.9 in adult (H)  Body mass index is 38.01 kg/m .  Wt Readings from Last 4 Encounters:   10/29/24 113.4 kg (250 lb)   11/24/23 108.4 kg (239 lb)   01/25/23 101.2 kg (223 lb 2 oz)   10/31/22 101.3 kg (223 lb 4 oz)     Trying to be more active, has had a hard year with inactivity due to injuries. Trying to eat well.       Benign essential hypertension  BP Readings from Last 6 Encounters:    10/29/24 (!) 143/95   11/24/23 133/86   01/25/23 136/84   10/31/22 128/82   01/21/22 129/84   01/05/22 (!) 145/89     Currently taking 37.5mg hydrochlorothiazide and 100mg losartan.   Hasn't been checking at home.       Health Care Directive    Patient does not have a Health Care Directive: Patient states has Advance Directive and will bring in a copy to clinic.        10/29/2024   General Health   How would you rate your overall physical health? Good   Feel stress (tense, anxious, or unable to sleep) Only a little      (!) STRESS CONCERN      10/29/2024   Nutrition   Three or more servings of calcium each day? Yes   Diet: Regular (no restrictions)   How many servings of fruit and vegetables per day? (!) 2-3   How many sweetened beverages each day? (!) 2            10/29/2024   Exercise   Days per week of moderate/strenous exercise Patient declined   Average minutes spent exercising at this level Patient declined            10/29/2024   Social Factors   Frequency of gathering with friends or relatives Once a week   Worry food won't last until get money to buy more No   Food not last or not have enough money for food? No   Do you have housing? (Housing is defined as stable permanent housing and does not include staying ouside in a car, in a tent, in an abandoned building, in an overnight shelter, or couch-surfing.) Yes   Are you worried about losing your housing? No   Lack of transportation? No   Unable to get utilities (heat,electricity)? No            10/29/2024   Fall Risk   Fallen 2 or more times in the past year? No    Trouble with walking or balance? No        Patient-reported          10/29/2024   Dental   Dentist two times every year? Yes            10/29/2024   TB Screening   Were you born outside of the [place]? Yes            Today's PHQ-2 Score:       10/29/2024    12:05 PM   PHQ-2 ( 1999 [employer])   Q1: Little interest or pleasure in doing things 0    Q2: Feeling down, depressed or hopeless 0    PHQ-2 Score 0     Q1: Little interest or pleasure in doing things Not at all   Q2: Feeling down, depressed or hopeless Not at all   PHQ-2 Score 0       Patient-reported           10/29/2024   Substance Use   Alcohol more than 3/day or more than 7/wk Yes   How often do you have a drink containing alcohol 2 to 3 times a week   How many alcohol drinks on typical day 1 or 2   How often do you have 5+ drinks at one occasion Never   Audit 2/3 Score 0   How often not able to stop drinking once started Never   How often failed to do what normally expected Never   How often needed first drink in am after a heavy drinking session Never   How often feeling of guilt or remorse after drinking Never   How often unable to remember what happened the night before Never   Have you or someone else been injured because of your drinking No   Has anyone been concerned or suggested you cut down on drinking No   TOTAL SCORE - AUDIT 3   Do you use any other substances recreationally? No        Social History     Tobacco Use    Smoking status: Former     Current packs/day: 0.50     Average packs/day: 0.5 packs/day for 7.0 years (3.5 ttl pk-yrs)     Types: Cigarettes    Smokeless tobacco: Never   Vaping Use    Vaping status: Never Used   Substance Use Topics    Alcohol use: Yes    Drug use: No           11/24/2023   LAST FHS-7 RESULTS   1st degree relative breast or ovarian cancer Yes    Any relative bilateral breast cancer No    Any male have breast cancer No    Any ONE woman have BOTH breast AND ovarian cancer Yes    Any woman with breast cancer before 50yrs Yes    2 or more relatives with breast AND/OR ovarian cancer Yes    2 or more relatives with breast AND/OR bowel cancer Yes        Patient-reported        Mammogram Screening - Mammogram every 1-2 years updated in Health Maintenance based on mutual decision making        10/29/2024   STI Screening   New sexual partner(s) since last STI/HIV test? No        History of abnormal Pap smear: No - age 30- 64  "PAP with HPV every 5 years recommended        Latest Ref Rng & Units 1/5/2022     3:23 PM 12/17/2015     9:43 AM   PAP / HPV   PAP  Negative for Intraepithelial Lesion or Malignancy (NILM)  Negative for squamous intraepithelial lesion or malignancy  Electronically signed by Shireen Quiroz CT (ASCP) on 12/30/2015 at  3:04 PM      HPV 16 DNA Negative Negative     HPV 18 DNA Negative Negative     Other HR HPV Negative Negative       ASCVD Risk   The 10-year ASCVD risk score (Melquiades SHEPARD, et al., 2019) is: 8%    Values used to calculate the score:      Age: 64 years      Sex: Female      Is Non- : No      Diabetic: No      Tobacco smoker: No      Systolic Blood Pressure: 141 mmHg      Is BP treated: Yes      HDL Cholesterol: 69 mg/dL      Total Cholesterol: 234 mg/dL         Reviewed and updated as needed this visit by Provider   Tobacco  Allergies  Meds  Problems  Med Hx  Surg Hx  Fam Hx  Soc   Hx Sexual Activity          Review of Systems  Constitutional, HEENT, cardiovascular, pulmonary, GI, , musculoskeletal, neuro, skin, endocrine and psych systems are negative, except as otherwise noted.     Objective    Exam  BP (!) 143/95   Pulse 61   Temp 98.1  F (36.7  C) (Oral)   Resp 16   Ht 1.727 m (5' 8\")   Wt 113.4 kg (250 lb)   SpO2 95%   BMI 38.01 kg/m     Estimated body mass index is 38.01 kg/m  as calculated from the following:    Height as of this encounter: 1.727 m (5' 8\").    Weight as of this encounter: 113.4 kg (250 lb).    Physical Exam    GENERAL: alert and no distress  EYES: Eyes grossly normal to inspection, PERRL and conjunctivae and sclerae normal  HENT: ear canals and TM's normal, nose and mouth without ulcers or lesions  NECK: no adenopathy, no asymmetry, masses, or scars  RESP: lungs clear to auscultation - no rales, rhonchi or wheezes  CV: regular rate and rhythm, normal S1 S2, no S3 or S4, no murmur, click or rub, no peripheral edema  ABDOMEN: soft, " nontender, no hepatosplenomegaly, no masses and bowel sounds normal  MS: no gross musculoskeletal defects noted, no edema  SKIN: no suspicious lesions or rashes  NEURO: Normal strength and tone, mentation intact and speech normal  PSYCH: mentation appears normal, affect normal/bright        Signed Electronically by: Maritza Ackerman DO

## 2024-10-30 ENCOUNTER — PATIENT OUTREACH (OUTPATIENT)
Dept: CARE COORDINATION | Facility: CLINIC | Age: 64
End: 2024-10-30
Payer: COMMERCIAL

## 2024-10-30 LAB
ANION GAP SERPL CALCULATED.3IONS-SCNC: 11 MMOL/L (ref 7–15)
BUN SERPL-MCNC: 8.6 MG/DL (ref 8–23)
CALCIUM SERPL-MCNC: 9.3 MG/DL (ref 8.8–10.4)
CHLORIDE SERPL-SCNC: 97 MMOL/L (ref 98–107)
CHOLEST SERPL-MCNC: 237 MG/DL
CREAT SERPL-MCNC: 0.62 MG/DL (ref 0.51–0.95)
EGFRCR SERPLBLD CKD-EPI 2021: >90 ML/MIN/1.73M2
FASTING STATUS PATIENT QL REPORTED: NO
FASTING STATUS PATIENT QL REPORTED: NO
GLUCOSE SERPL-MCNC: 99 MG/DL (ref 70–99)
HCO3 SERPL-SCNC: 27 MMOL/L (ref 22–29)
HDLC SERPL-MCNC: 61 MG/DL
LDLC SERPL CALC-MCNC: 153 MG/DL
NONHDLC SERPL-MCNC: 176 MG/DL
POTASSIUM SERPL-SCNC: 4 MMOL/L (ref 3.4–5.3)
SODIUM SERPL-SCNC: 135 MMOL/L (ref 135–145)
TRIGL SERPL-MCNC: 116 MG/DL

## 2024-10-30 NOTE — RESULT ENCOUNTER NOTE
The 10-year ASCVD risk score (Melquiades SHEPARD, et al., 2019) is: 8.7%  Patient updated by [employer] message with lab results.       Lee Cerda,  Your labs have returned.  1.  Metabolic panel normal.  2.  Cholesterol labs fairly stable from last check.  We no longer treat cholesterol to numbers alone, rather estimated risk of cardiovascular disease.  Your risk is mildly elevated, however I think this is related to the elevated blood pressure reading from clinic.  We will reevaluate with updated blood pressure readings in a couple weeks.  If cardiovascular risk remains elevated, I think reasonable to consider initiation of a statin medication to help reduce risk of cardiovascular disease.  Please keep working on a heart healthy nutrient dense diet, regular physical activity, and weight management to also help reduce risk.  Please reach out by [employer] with any follow-up questions or concerns.  Maritza Ackerman, DO

## 2024-11-01 ENCOUNTER — PATIENT OUTREACH (OUTPATIENT)
Dept: CARE COORDINATION | Facility: CLINIC | Age: 64
End: 2024-11-01
Payer: COMMERCIAL

## 2024-11-15 ENCOUNTER — TELEPHONE (OUTPATIENT)
Dept: FAMILY MEDICINE | Facility: CLINIC | Age: 64
End: 2024-11-15

## 2024-11-15 NOTE — TELEPHONE ENCOUNTER
Patient Quality Outreach    Patient is due for the following:   Hypertension -  BP check    Action(s) Taken:   Schedule a nurse only visit for blood pressure    Type of outreach:    Sent [email or web address] message.    Questions for provider review:    None           Sree Palacios MA

## 2024-12-16 ENCOUNTER — TRANSFERRED RECORDS (OUTPATIENT)
Dept: HEALTH INFORMATION MANAGEMENT | Facility: CLINIC | Age: 64
End: 2024-12-16
Payer: COMMERCIAL

## 2025-01-06 DIAGNOSIS — M25.473 ANKLE SWELLING, UNSPECIFIED LATERALITY: Primary | ICD-10-CM

## 2025-01-06 DIAGNOSIS — M25.571 RIGHT ANKLE PAIN: ICD-10-CM

## 2025-02-03 ENCOUNTER — TRANSFERRED RECORDS (OUTPATIENT)
Dept: HEALTH INFORMATION MANAGEMENT | Facility: CLINIC | Age: 65
End: 2025-02-03
Payer: COMMERCIAL

## 2025-07-22 ENCOUNTER — TELEPHONE (OUTPATIENT)
Dept: FAMILY MEDICINE | Facility: CLINIC | Age: 65
End: 2025-07-22
Payer: COMMERCIAL

## 2025-07-22 NOTE — TELEPHONE ENCOUNTER
Patient Quality Outreach    Patient is due for the following:   Hypertension -  Hypertension follow-up visit    Action(s) Taken:   Schedule a office visit for Blood Pressure    Type of outreach:    Sent [employer] message.    Questions for provider review:    None         Sree Palacios MA  Chart routed to None.